# Patient Record
Sex: FEMALE | Race: WHITE | NOT HISPANIC OR LATINO | Employment: OTHER | URBAN - METROPOLITAN AREA
[De-identification: names, ages, dates, MRNs, and addresses within clinical notes are randomized per-mention and may not be internally consistent; named-entity substitution may affect disease eponyms.]

---

## 2023-03-08 ENCOUNTER — TELEPHONE (OUTPATIENT)
Dept: PRIMARY CARE | Facility: CLINIC | Age: 78
End: 2023-03-08
Payer: COMMERCIAL
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
## 2023-03-13 NOTE — TELEPHONE ENCOUNTER
Mena from [employer] called about patient wanting to get some health history on patient to decide if needs to go to nursing home. Would like to know to the best of your knowledge is patient able to walk alone,does she require 24/7 help,what is her level for taking care of herself,is daughter providing enough care. Please call [phone]

## 2023-03-14 NOTE — TELEPHONE ENCOUNTER
Called ambika at # given and notified her that patient has not been seen since nov 2022 and we could not give any  exact information due to this. Ambika stated understanding.

## 2023-04-19 DIAGNOSIS — M19.90 OSTEOARTHRITIS, UNSPECIFIED OSTEOARTHRITIS TYPE, UNSPECIFIED SITE: Primary | ICD-10-CM

## 2023-07-10 ENCOUNTER — TELEPHONE (OUTPATIENT)
Dept: PRIMARY CARE | Facility: CLINIC | Age: 78
End: 2023-07-10
Payer: COMMERCIAL

## 2023-07-10 NOTE — TELEPHONE ENCOUNTER
Adamaris with [facility] is calling to see if you will sign an H&P without seeing the patient?

## 2024-01-12 ENCOUNTER — TELEPHONE (OUTPATIENT)
Dept: NEUROLOGY | Facility: HOSPITAL | Age: 79
End: 2024-01-12
Payer: COMMERCIAL

## 2024-01-12 NOTE — TELEPHONE ENCOUNTER
"I received a call from [facility] requesting refill of Memantine.    I called POA to make FU appt for refill.  She was extremely confrontational, with multiple complaints about her sister, our clinic, the nursing home, the pharmacy.    I couldn't get her to focus and make an appt.  She decided angrily to \"find a new neurologist.\"  I thanked her for her time.    End of conversation.    [facility] updated.  "

## 2024-03-08 ENCOUNTER — APPOINTMENT (OUTPATIENT)
Dept: RADIOLOGY | Facility: HOSPITAL | Age: 79
End: 2024-03-08
Payer: COMMERCIAL

## 2024-03-08 ENCOUNTER — HOSPITAL ENCOUNTER (EMERGENCY)
Facility: HOSPITAL | Age: 79
Discharge: HOME | End: 2024-03-08
Attending: EMERGENCY MEDICINE
Payer: COMMERCIAL

## 2024-03-08 VITALS
OXYGEN SATURATION: 95 % | HEIGHT: 65 IN | RESPIRATION RATE: 18 BRPM | TEMPERATURE: 98.3 F | HEART RATE: 73 BPM | WEIGHT: 187 LBS | DIASTOLIC BLOOD PRESSURE: 123 MMHG | SYSTOLIC BLOOD PRESSURE: 142 MMHG | BODY MASS INDEX: 31.16 KG/M2
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
## 2024-03-08 DIAGNOSIS — S09.90XA CLOSED HEAD INJURY, INITIAL ENCOUNTER: Primary | ICD-10-CM

## 2024-03-08 PROCEDURE — 70450 CT HEAD/BRAIN W/O DYE: CPT

## 2024-03-08 PROCEDURE — 2500000004 HC RX 250 GENERAL PHARMACY W/ HCPCS (ALT 636 FOR OP/ED): Performed by: EMERGENCY MEDICINE

## 2024-03-08 PROCEDURE — 70450 CT HEAD/BRAIN W/O DYE: CPT | Performed by: RADIOLOGY

## 2024-03-08 PROCEDURE — 2500000004 HC RX 250 GENERAL PHARMACY W/ HCPCS (ALT 636 FOR OP/ED): Performed by: STUDENT IN AN ORGANIZED HEALTH CARE EDUCATION/TRAINING PROGRAM

## 2024-03-08 PROCEDURE — 99284 EMERGENCY DEPT VISIT MOD MDM: CPT | Mod: 25 | Performed by: EMERGENCY MEDICINE

## 2024-03-08 PROCEDURE — 96372 THER/PROPH/DIAG INJ SC/IM: CPT

## 2024-03-08 RX ORDER — LORAZEPAM 2 MG/ML
1 INJECTION INTRAMUSCULAR ONCE
Status: COMPLETED | OUTPATIENT
Start: 2024-03-08 | End: 2024-03-08

## 2024-03-08 RX ORDER — HALOPERIDOL 5 MG/ML
2 INJECTION INTRAMUSCULAR ONCE
Status: COMPLETED | OUTPATIENT
Start: 2024-03-08 | End: 2024-03-08

## 2024-03-08 RX ADMIN — HALOPERIDOL LACTATE 2 MG: 5 INJECTION, SOLUTION INTRAMUSCULAR at 06:24

## 2024-03-08 RX ADMIN — LORAZEPAM 1 MG: 2 INJECTION INTRAMUSCULAR; INTRAVENOUS at 05:16

## 2024-03-08 ASSESSMENT — PAIN DESCRIPTION - PAIN TYPE: TYPE: ACUTE PAIN

## 2024-03-08 ASSESSMENT — COLUMBIA-SUICIDE SEVERITY RATING SCALE - C-SSRS
6. HAVE YOU EVER DONE ANYTHING, STARTED TO DO ANYTHING, OR PREPARED TO DO ANYTHING TO END YOUR LIFE?: NO
2. HAVE YOU ACTUALLY HAD ANY THOUGHTS OF KILLING YOURSELF?: NO
1. IN THE PAST MONTH, HAVE YOU WISHED YOU WERE DEAD OR WISHED YOU COULD GO TO SLEEP AND NOT WAKE UP?: NO

## 2024-03-08 ASSESSMENT — LIFESTYLE VARIABLES
HAVE YOU EVER FELT YOU SHOULD CUT DOWN ON YOUR DRINKING: NO
HAVE PEOPLE ANNOYED YOU BY CRITICIZING YOUR DRINKING: NO
EVER HAD A DRINK FIRST THING IN THE MORNING TO STEADY YOUR NERVES TO GET RID OF A HANGOVER: NO
EVER FELT BAD OR GUILTY ABOUT YOUR DRINKING: NO

## 2024-03-08 ASSESSMENT — PAIN DESCRIPTION - LOCATION: LOCATION: HEAD

## 2024-03-08 ASSESSMENT — PAIN - FUNCTIONAL ASSESSMENT: PAIN_FUNCTIONAL_ASSESSMENT: 0-10

## 2024-03-08 ASSESSMENT — PAIN DESCRIPTION - PROGRESSION: CLINICAL_PROGRESSION: NOT CHANGED

## 2024-03-08 ASSESSMENT — PAIN SCALES - GENERAL
PAINLEVEL_OUTOF10: 4
PAINLEVEL_OUTOF10: 5 - MODERATE PAIN

## 2024-03-08 NOTE — PROGRESS NOTES
Patient received in sign out from Dr Faye.  Patient presenting after fall from transfer from bed to wheelchair.  Head CT was able to be obtained.  When attempts were made to obtain cervical spine CT, patient would not allow this.  Any attempts to place patient on her back resulted in patient becoming combative.  Patient was given Ativan and Haldol and while this did cause her to go to sleep, any attempts to move around her back caused her to wake up and become combative.  I palpated cervical spine and there does not seem to be any pain to palpation.  I feel that the risks of performing sedation for CT outweigh risks of missed injury in setting of no apparent tenderness to palpation.  Head CT without acute findings.  Return precautions given for any worsening symptoms.  Parts of this chart were completed with dictation software, please excuse any errors in transcription.

## 2024-03-08 NOTE — ED PROVIDER NOTES
HPI   Chief Complaint   Patient presents with    Fall         History provided by:  EMS personnel and caregiver  History limited by:  Dementia    78-year-old female with a history of dementia lives in a memory care unit had an unwitnessed fall about 30 minutes prior to arrival to the emergency department.  Patient does not remember the fall.  She does not have any specific complaints.  She apparently normally transitions from her bed to a wheelchair.  There is no history of recent illness relayed from the nursing home.  No other complaints.                  Dillon Coma Scale Score: 15                     Patient History   Past Medical History:   Diagnosis Date    Personal history of malignant neoplasm of breast     History of malignant neoplasm of breast     Past Surgical History:   Procedure Laterality Date    OTHER SURGICAL HISTORY  09/18/2020    Breast surgery    OTHER SURGICAL HISTORY  09/18/2020    Tubal ligation     No family history on file.  Social History     Tobacco Use    Smoking status: Not on file    Smokeless tobacco: Not on file   Substance Use Topics    Alcohol use: Not on file    Drug use: Not on file       Physical Exam   ED Triage Vitals   Temp Pulse Resp BP   -- -- -- --      SpO2 Temp src Heart Rate Source Patient Position   -- -- -- --      BP Location FiO2 (%)     -- --       Physical Exam  General:  Awake, alert, no acute distress.  Head: Normocephalic, hematoma right frontal region  Neck: Supple, trachea midline, no stridor, no bony tenderness  Skin: Warm and dry, no rashes, small skin tear dorsum right hand  Lungs:  No acute respiratory distress, speaking in full sentences without difficulty  Neuro:  No gross focal neurologic deficits, NIH is 0  Musculoskeletal:  Full range of motion in all 4 extremities  Psychiatric: Confused consistent with her dementia  ED Course & MDM   Diagnoses as of 03/08/24 2223   Closed head injury, initial encounter   Fall, initial encounter       Medical  Decision Making  Head and C-spine CTs are negative.  This appears to be mechanical fall.  She does not appear to have any other injuries.  She is stable for discharge back to the nursing facility    Procedure  Procedures     Isael Faye DO  03/08/24 [id number]

## 2024-03-08 NOTE — ED TRIAGE NOTES
Pt presents via EMS after and unwitnessed fall at [facility]. Pt is A/O to 2 and a poor historian. Pt is not on blood thinners. Pt has c/o a headache, a small abrasion on her right cheek and a small lac on her right hand. Pt has no c/o neck or back pain. EMS attempted to place a c-collar on the pt and she could not tolerate it.

## 2024-03-15 ENCOUNTER — NURSING HOME VISIT (OUTPATIENT)
Dept: POST ACUTE CARE | Facility: EXTERNAL LOCATION | Age: 79
End: 2024-03-15
Payer: COMMERCIAL

## 2024-03-15 DIAGNOSIS — F03.A0 MILD DEMENTIA WITHOUT BEHAVIORAL DISTURBANCE, PSYCHOTIC DISTURBANCE, MOOD DISTURBANCE, OR ANXIETY, UNSPECIFIED DEMENTIA TYPE (MULTI): Primary | ICD-10-CM

## 2024-03-15 DIAGNOSIS — M79.89 LEG SWELLING: ICD-10-CM

## 2024-03-15 DIAGNOSIS — F33.9 MAJOR DEPRESSION, RECURRENT, CHRONIC (CMS-HCC): ICD-10-CM

## 2024-03-15 PROCEDURE — 99305 1ST NF CARE MODERATE MDM 35: CPT | Performed by: INTERNAL MEDICINE

## 2024-03-15 NOTE — LETTER
Patient: Stephanie Johnson  : 1945    Encounter Date: 03/15/2024    [facility]: [facility]  HISTORY AND PHYSICAL NOTE      Stephanie Johnson is a 78 y.o. female that is being seen  today for admission for rehab at nursing facility  Subjective  Patient is a 78-year-old female with history of dementia and leg swelling who is being seen for admission at [facility].  Patient was recently at a memory care unit where she had a fall and was seen in the hospital.  Patient had imaging done which was negative for any fracture.  Patient was diagnosed with UTI and treated with antibiotics.  Patient is now being admitted at [facility].  Patient is not able to provide adequate review of system or history.  All history is taken from the charts.      ROS  Patient is demented and not able to provide adequate review of system    Vital signs are stable  Medication list reviewed in the facility.    Physical Exam  Constitutional: Patient does not appear to be in any acute distress  Head and Face: NCAT  Eyes: Normal external exam, EOMI  ENT: Normal external inspection of ears and nose. Oropharynx normal.  Cardiovascular: RRR, S1/S2, no murmurs, rubs, or gallops, radial pulses +2, mild swelling of the legs present  Pulmonary: CTAB, no respiratory distress.  Abdomen: +BS, soft, non-tender, nondistended, no guarding or rebound, no masses noted  MSK: Patient has difficulty in walking and is in wheelchair  Skin-few bruises present  Peripheral puslses palpable bilaterally 2+  Neuro: AAO X1, Cranial nerves 2-12 grossly intact,DTR 2+ in all 4 limbs       LABS   [unfilled]  Lab Results   Component Value Date    GLUCOSE 85 2023    CALCIUM 9.3 2023     2023    K 3.9 2023    CO2 26 2023     2023    BUN 10 2023    CREATININE 0.9 2023     Lab Results   Component Value Date    ALT 7 2023    AST 19 2023    ALKPHOS 72 2023     BILITOT 0.4 07/12/2023     Lab Results   Component Value Date    WBC 8.9 07/12/2023    HGB 13.2 07/12/2023    HCT 39.1 07/12/2023    MCV 86.5 07/12/2023     07/12/2023     Lab Results   Component Value Date    CHOL 165 07/12/2023    CHOL 170 08/15/2022     Lab Results   Component Value Date    HDL 49 (L) 07/12/2023    HDL 47.1 08/15/2022     Lab Results   Component Value Date    LDLCALC 102 07/12/2023     Lab Results   Component Value Date    TRIG 69 07/12/2023    TRIG 104 08/15/2022     Lab Results   Component Value Date    HGBA1C 5.8 07/12/2023     Other labs not included in the list above were reviewed either before or during this encounter.    History   Past Medical History:   Diagnosis Date   • Personal history of malignant neoplasm of breast     History of malignant neoplasm of breast     Past Surgical History:   Procedure Laterality Date   • OTHER SURGICAL HISTORY  09/18/2020    Breast surgery   • OTHER SURGICAL HISTORY  09/18/2020    Tubal ligation     No family history on file.  No Known Allergies  No current outpatient medications on file prior to visit.     No current facility-administered medications on file prior to visit.       There is no immunization history on file for this patient.  Patient's medical history was reviewed and updated either before or during this encounter.  ASSESSMENT / PLAN:  Diagnoses and all orders for this visit:  Mild dementia without behavioral disturbance, psychotic disturbance, mood disturbance, or anxiety, unspecified dementia type (CMS/HCC)  Major depression, recurrent, chronic (CMS/HCC)  Leg swelling    Patient is being seen for admission at nursing home after hospitalization.  Patient hospital records reviewed.  Patient has been on diuretics for leg swelling.  Kidney functions were stable.  Imaging was done in the hospital reviewed and were stable.  Patient needs assistance with her activities.  Patient also will be getting physical therapy.      Uriel WELLER  MD Ruy       Electronically Signed By: Uriel Redmond MD   3/30/24  5:37 PM

## 2024-03-22 ENCOUNTER — NURSING HOME VISIT (OUTPATIENT)
Dept: POST ACUTE CARE | Facility: EXTERNAL LOCATION | Age: 79
End: 2024-03-22
Payer: COMMERCIAL

## 2024-03-22 DIAGNOSIS — F03.A11 MILD DEMENTIA WITH AGITATION, UNSPECIFIED DEMENTIA TYPE (MULTI): ICD-10-CM

## 2024-03-22 DIAGNOSIS — E55.9 VITAMIN D DEFICIENCY: ICD-10-CM

## 2024-03-22 DIAGNOSIS — F33.9 MAJOR DEPRESSION, RECURRENT, CHRONIC (CMS-HCC): Primary | ICD-10-CM

## 2024-03-22 DIAGNOSIS — M79.89 LEG SWELLING: ICD-10-CM

## 2024-03-22 PROCEDURE — 99308 SBSQ NF CARE LOW MDM 20: CPT | Performed by: INTERNAL MEDICINE

## 2024-03-22 NOTE — LETTER
Patient: Stephanie Johnson  : 1945    Encounter Date: 2024    [facility]: [facility]  PROGRESS NOTE      Stephanie Johnson is a 78 y.o. female that is being seen  today for follow up at rehab.  Subjective  Patient is a 78-year-old female with history of dementia and leg swelling who is being seen for follow up at nursing facility.  Patient's lab work reviewed has been stable.  Patient is on diuretics and kidney functions are stable.  Patient does have low vitamin D levels.  Patient has been on vitamin D supplementation.  Patient has been getting physical therapy.  No recent falls.      ROS  Patient is demented and not able to provide adequate review of system    Vital signs are stable  Medication list reviewed in the facility.    Physical Exam  Constitutional: Patient does not appear to be in any acute distress  Head and Face: NCAT  Eyes: Normal external exam, EOMI  ENT: Normal external inspection of ears and nose. Oropharynx normal.  Cardiovascular: RRR, S1/S2, no murmurs, rubs, or gallops, radial pulses +2, mild swelling of the legs present  Pulmonary: CTAB, no respiratory distress.  Abdomen: +BS, soft, non-tender, nondistended, no guarding or rebound, no masses noted  MSK: Patient has difficulty in walking and is in wheelchair  Skin-few bruises present  Peripheral puslses palpable bilaterally 2+  Neuro: AAO X1, Cranial nerves 2-12 grossly intact,DTR 2+ in all 4 limbs       LABS   [unfilled]  Lab Results   Component Value Date    GLUCOSE 85 2023    CALCIUM 9.3 2023     2023    K 3.9 2023    CO2 26 2023     2023    BUN 10 2023    CREATININE 0.9 2023     Lab Results   Component Value Date    ALT 7 2023    AST 19 2023    ALKPHOS 72 2023    BILITOT 0.4 2023     Lab Results   Component Value Date    WBC 8.9 2023    HGB 13.2 2023    HCT 39.1 2023    MCV 86.5 2023      07/12/2023     Lab Results   Component Value Date    CHOL 165 07/12/2023    CHOL 170 08/15/2022     Lab Results   Component Value Date    HDL 49 (L) 07/12/2023    HDL 47.1 08/15/2022     Lab Results   Component Value Date    LDLCALC 102 07/12/2023     Lab Results   Component Value Date    TRIG 69 07/12/2023    TRIG 104 08/15/2022     Lab Results   Component Value Date    HGBA1C 5.8 07/12/2023     Other labs not included in the list above were reviewed either before or during this encounter.    History   Past Medical History:   Diagnosis Date   • Personal history of malignant neoplasm of breast     History of malignant neoplasm of breast     Past Surgical History:   Procedure Laterality Date   • OTHER SURGICAL HISTORY  09/18/2020    Breast surgery   • OTHER SURGICAL HISTORY  09/18/2020    Tubal ligation     No family history on file.  No Known Allergies  No current outpatient medications on file prior to visit.     No current facility-administered medications on file prior to visit.       There is no immunization history on file for this patient.  Patient's medical history was reviewed and updated either before or during this encounter.  ASSESSMENT / PLAN:  Diagnoses and all orders for this visit:  Major depression, recurrent, chronic (CMS/HCC)  Mild dementia with agitation, unspecified dementia type (CMS/HCC)  Leg swelling  Vitamin D deficiency    Patient is being seen for follow-up at the nursing home.  Patient had lab work done which shows low vitamin D.  Patient has been on vitamin D supplementation.  Patient is getting physical therapy.    Uriel Redmond MD       Electronically Signed By: Uriel Redmond MD   3/30/24  5:45 PM

## 2024-03-29 ENCOUNTER — NURSING HOME VISIT (OUTPATIENT)
Dept: POST ACUTE CARE | Facility: EXTERNAL LOCATION | Age: 79
End: 2024-03-29
Payer: COMMERCIAL

## 2024-03-29 DIAGNOSIS — F03.A11 MILD DEMENTIA WITH AGITATION, UNSPECIFIED DEMENTIA TYPE (MULTI): ICD-10-CM

## 2024-03-29 DIAGNOSIS — M79.89 LEG SWELLING: ICD-10-CM

## 2024-03-29 DIAGNOSIS — F33.9 MAJOR DEPRESSION, RECURRENT, CHRONIC (CMS-HCC): ICD-10-CM

## 2024-03-29 DIAGNOSIS — E55.9 VITAMIN D DEFICIENCY: Primary | ICD-10-CM

## 2024-03-29 PROCEDURE — 99309 SBSQ NF CARE MODERATE MDM 30: CPT | Performed by: INTERNAL MEDICINE

## 2024-03-29 NOTE — LETTER
Patient: Stephanie Johnson  : 1945    Encounter Date: 2024    [facility]: [facility]  PROGRESS NOTE      Stephanie Johnson is a 78 y.o. female that is being seen  today for follow up at rehab.  Subjective  Patient is a 78-year-old female with history of dementia and leg swelling who is being seen for follow up at nursing facility.  Patient's lab work reviewed has been stable.  Patient is on diuretics and kidney functions are stable.  Patient does have low vitamin D levels.  Patient has been on vitamin D supplementation.  Patient has been getting physical therapy.  No recent falls.  Patient has been scheduled to be transferred to another facility in the memory unit.      ROS  Patient is demented and not able to provide adequate review of system    Vital signs are stable  Medication list reviewed in the facility.    Physical Exam  Constitutional: Patient does not appear to be in any acute distress  Head and Face: NCAT  Eyes: Normal external exam, EOMI  ENT: Normal external inspection of ears and nose. Oropharynx normal.  Cardiovascular: RRR, S1/S2, no murmurs, rubs, or gallops, radial pulses +2, mild swelling of the legs present  Pulmonary: CTAB, no respiratory distress.  Abdomen: +BS, soft, non-tender, nondistended, no guarding or rebound, no masses noted  MSK: Patient has difficulty in walking and is in wheelchair  Skin-few bruises present  Peripheral puslses palpable bilaterally 2+  Neuro: AAO X1, Cranial nerves 2-12 grossly intact,DTR 2+ in all 4 limbs       LABS   [unfilled]  Lab Results   Component Value Date    GLUCOSE 85 2023    CALCIUM 9.3 2023     2023    K 3.9 2023    CO2 26 2023     2023    BUN 10 2023    CREATININE 0.9 2023     Lab Results   Component Value Date    ALT 7 2023    AST 19 2023    ALKPHOS 72 2023    BILITOT 0.4 2023     Lab Results   Component Value Date    WBC 8.9 2023     HGB 13.2 07/12/2023    HCT 39.1 07/12/2023    MCV 86.5 07/12/2023     07/12/2023     Lab Results   Component Value Date    CHOL 165 07/12/2023    CHOL 170 08/15/2022     Lab Results   Component Value Date    HDL 49 (L) 07/12/2023    HDL 47.1 08/15/2022     Lab Results   Component Value Date    LDLCALC 102 07/12/2023     Lab Results   Component Value Date    TRIG 69 07/12/2023    TRIG 104 08/15/2022     Lab Results   Component Value Date    HGBA1C 5.8 07/12/2023     Other labs not included in the list above were reviewed either before or during this encounter.    History   Past Medical History:   Diagnosis Date   • Personal history of malignant neoplasm of breast     History of malignant neoplasm of breast     Past Surgical History:   Procedure Laterality Date   • OTHER SURGICAL HISTORY  09/18/2020    Breast surgery   • OTHER SURGICAL HISTORY  09/18/2020    Tubal ligation     No family history on file.  No Known Allergies  No current outpatient medications on file prior to visit.     No current facility-administered medications on file prior to visit.       There is no immunization history on file for this patient.  Patient's medical history was reviewed and updated either before or during this encounter.  ASSESSMENT / PLAN:  Diagnoses and all orders for this visit:  Vitamin D deficiency  Mild dementia with agitation, unspecified dementia type (CMS/HCC)  Major depression, recurrent, chronic (CMS/HCC)  Leg swelling    Patient is being seen for follow-up at the nursing home.  Patient had lab work done which shows low vitamin D.  Patient has been on vitamin D supplementation.  Patient is getting physical therapy.    Uriel Redmond MD       Electronically Signed By: Uriel Redmond MD   3/30/24  5:47 PM

## 2024-03-30 PROBLEM — E55.9 VITAMIN D DEFICIENCY: Status: ACTIVE | Noted: 2024-03-30

## 2024-03-30 PROBLEM — F03.A11: Status: ACTIVE | Noted: 2024-03-30

## 2024-03-30 PROBLEM — F33.9 MAJOR DEPRESSION, RECURRENT, CHRONIC (CMS-HCC): Status: ACTIVE | Noted: 2024-03-30

## 2024-03-30 PROBLEM — E55.9 VITAMIN D DEFICIENCY: Status: RESOLVED | Noted: 2024-03-30 | Resolved: 2024-03-30

## 2024-03-30 PROBLEM — M79.89 LEG SWELLING: Status: ACTIVE | Noted: 2024-03-30

## 2024-03-30 PROBLEM — F03.A0 MILD DEMENTIA WITHOUT BEHAVIORAL DISTURBANCE, PSYCHOTIC DISTURBANCE, MOOD DISTURBANCE, OR ANXIETY, UNSPECIFIED DEMENTIA TYPE (MULTI): Status: ACTIVE | Noted: 2024-03-30

## 2024-03-30 NOTE — PROGRESS NOTES
[facility]: [facility]  HISTORY AND PHYSICAL NOTE      Stephanie Johnson is a 78 y.o. female that is being seen  today for admission for rehab at nursing facility  Subjective   Patient is a 78-year-old female with history of dementia and leg swelling who is being seen for admission at [facility].  Patient was recently at a memory care unit where she had a fall and was seen in the hospital.  Patient had imaging done which was negative for any fracture.  Patient was diagnosed with UTI and treated with antibiotics.  Patient is now being admitted at [facility].  Patient is not able to provide adequate review of system or history.  All history is taken from the charts.      ROS  Patient is demented and not able to provide adequate review of system    Vital signs are stable  Medication list reviewed in the facility.    Physical Exam  Constitutional: Patient does not appear to be in any acute distress  Head and Face: NCAT  Eyes: Normal external exam, EOMI  ENT: Normal external inspection of ears and nose. Oropharynx normal.  Cardiovascular: RRR, S1/S2, no murmurs, rubs, or gallops, radial pulses +2, mild swelling of the legs present  Pulmonary: CTAB, no respiratory distress.  Abdomen: +BS, soft, non-tender, nondistended, no guarding or rebound, no masses noted  MSK: Patient has difficulty in walking and is in wheelchair  Skin-few bruises present  Peripheral puslses palpable bilaterally 2+  Neuro: AAO X1, Cranial nerves 2-12 grossly intact,DTR 2+ in all 4 limbs       LABS   [unfilled]  Lab Results   Component Value Date    GLUCOSE 85 07/12/2023    CALCIUM 9.3 07/12/2023     07/12/2023    K 3.9 07/12/2023    CO2 26 07/12/2023     07/12/2023    BUN 10 07/12/2023    CREATININE 0.9 07/12/2023     Lab Results   Component Value Date    ALT 7 07/12/2023    AST 19 07/12/2023    ALKPHOS 72 07/12/2023    BILITOT 0.4 07/12/2023     Lab Results   Component Value Date    WBC 8.9  07/12/2023    HGB 13.2 07/12/2023    HCT 39.1 07/12/2023    MCV 86.5 07/12/2023     07/12/2023     Lab Results   Component Value Date    CHOL 165 07/12/2023    CHOL 170 08/15/2022     Lab Results   Component Value Date    HDL 49 (L) 07/12/2023    HDL 47.1 08/15/2022     Lab Results   Component Value Date    LDLCALC 102 07/12/2023     Lab Results   Component Value Date    TRIG 69 07/12/2023    TRIG 104 08/15/2022     Lab Results   Component Value Date    HGBA1C 5.8 07/12/2023     Other labs not included in the list above were reviewed either before or during this encounter.    History    Past Medical History:   Diagnosis Date    Personal history of malignant neoplasm of breast     History of malignant neoplasm of breast     Past Surgical History:   Procedure Laterality Date    OTHER SURGICAL HISTORY  09/18/2020    Breast surgery    OTHER SURGICAL HISTORY  09/18/2020    Tubal ligation     No family history on file.  No Known Allergies  No current outpatient medications on file prior to visit.     No current facility-administered medications on file prior to visit.       There is no immunization history on file for this patient.  Patient's medical history was reviewed and updated either before or during this encounter.  ASSESSMENT / PLAN:  Diagnoses and all orders for this visit:  Mild dementia without behavioral disturbance, psychotic disturbance, mood disturbance, or anxiety, unspecified dementia type (CMS/HCC)  Major depression, recurrent, chronic (CMS/HCC)  Leg swelling    Patient is being seen for admission at nursing home after hospitalization.  Patient hospital records reviewed.  Patient has been on diuretics for leg swelling.  Kidney functions were stable.  Imaging was done in the hospital reviewed and were stable.  Patient needs assistance with her activities.  Patient also will be getting physical therapy.      Uriel Redmond MD

## 2024-03-30 NOTE — PROGRESS NOTES
[facility]: [facility]  PROGRESS NOTE      Stephanie Johnson is a 78 y.o. female that is being seen  today for follow up at rehab.  Subjective   Patient is a 78-year-old female with history of dementia and leg swelling who is being seen for follow up at nursing facility.  Patient's lab work reviewed has been stable.  Patient is on diuretics and kidney functions are stable.  Patient does have low vitamin D levels.  Patient has been on vitamin D supplementation.  Patient has been getting physical therapy.  No recent falls.  Patient has been scheduled to be transferred to another facility in the memory unit.      ROS  Patient is demented and not able to provide adequate review of system    Vital signs are stable  Medication list reviewed in the facility.    Physical Exam  Constitutional: Patient does not appear to be in any acute distress  Head and Face: NCAT  Eyes: Normal external exam, EOMI  ENT: Normal external inspection of ears and nose. Oropharynx normal.  Cardiovascular: RRR, S1/S2, no murmurs, rubs, or gallops, radial pulses +2, mild swelling of the legs present  Pulmonary: CTAB, no respiratory distress.  Abdomen: +BS, soft, non-tender, nondistended, no guarding or rebound, no masses noted  MSK: Patient has difficulty in walking and is in wheelchair  Skin-few bruises present  Peripheral puslses palpable bilaterally 2+  Neuro: AAO X1, Cranial nerves 2-12 grossly intact,DTR 2+ in all 4 limbs       LABS   [unfilled]  Lab Results   Component Value Date    GLUCOSE 85 07/12/2023    CALCIUM 9.3 07/12/2023     07/12/2023    K 3.9 07/12/2023    CO2 26 07/12/2023     07/12/2023    BUN 10 07/12/2023    CREATININE 0.9 07/12/2023     Lab Results   Component Value Date    ALT 7 07/12/2023    AST 19 07/12/2023    ALKPHOS 72 07/12/2023    BILITOT 0.4 07/12/2023     Lab Results   Component Value Date    WBC 8.9 07/12/2023    HGB 13.2 07/12/2023    HCT 39.1 07/12/2023    MCV 86.5 07/12/2023      07/12/2023     Lab Results   Component Value Date    CHOL 165 07/12/2023    CHOL 170 08/15/2022     Lab Results   Component Value Date    HDL 49 (L) 07/12/2023    HDL 47.1 08/15/2022     Lab Results   Component Value Date    LDLCALC 102 07/12/2023     Lab Results   Component Value Date    TRIG 69 07/12/2023    TRIG 104 08/15/2022     Lab Results   Component Value Date    HGBA1C 5.8 07/12/2023     Other labs not included in the list above were reviewed either before or during this encounter.    History    Past Medical History:   Diagnosis Date    Personal history of malignant neoplasm of breast     History of malignant neoplasm of breast     Past Surgical History:   Procedure Laterality Date    OTHER SURGICAL HISTORY  09/18/2020    Breast surgery    OTHER SURGICAL HISTORY  09/18/2020    Tubal ligation     No family history on file.  No Known Allergies  No current outpatient medications on file prior to visit.     No current facility-administered medications on file prior to visit.       There is no immunization history on file for this patient.  Patient's medical history was reviewed and updated either before or during this encounter.  ASSESSMENT / PLAN:  Diagnoses and all orders for this visit:  Vitamin D deficiency  Mild dementia with agitation, unspecified dementia type (CMS/HCC)  Major depression, recurrent, chronic (CMS/HCC)  Leg swelling    Patient is being seen for follow-up at the nursing home.  Patient had lab work done which shows low vitamin D.  Patient has been on vitamin D supplementation.  Patient is getting physical therapy.    Uriel Redmond MD

## 2024-03-30 NOTE — PROGRESS NOTES
[facility]: [facility]  PROGRESS NOTE      Stephanie Johnson is a 78 y.o. female that is being seen  today for follow up at rehab.  Subjective   Patient is a 78-year-old female with history of dementia and leg swelling who is being seen for follow up at nursing facility.  Patient's lab work reviewed has been stable.  Patient is on diuretics and kidney functions are stable.  Patient does have low vitamin D levels.  Patient has been on vitamin D supplementation.  Patient has been getting physical therapy.  No recent falls.      ROS  Patient is demented and not able to provide adequate review of system    Vital signs are stable  Medication list reviewed in the facility.    Physical Exam  Constitutional: Patient does not appear to be in any acute distress  Head and Face: NCAT  Eyes: Normal external exam, EOMI  ENT: Normal external inspection of ears and nose. Oropharynx normal.  Cardiovascular: RRR, S1/S2, no murmurs, rubs, or gallops, radial pulses +2, mild swelling of the legs present  Pulmonary: CTAB, no respiratory distress.  Abdomen: +BS, soft, non-tender, nondistended, no guarding or rebound, no masses noted  MSK: Patient has difficulty in walking and is in wheelchair  Skin-few bruises present  Peripheral puslses palpable bilaterally 2+  Neuro: AAO X1, Cranial nerves 2-12 grossly intact,DTR 2+ in all 4 limbs       LABS   [unfilled]  Lab Results   Component Value Date    GLUCOSE 85 07/12/2023    CALCIUM 9.3 07/12/2023     07/12/2023    K 3.9 07/12/2023    CO2 26 07/12/2023     07/12/2023    BUN 10 07/12/2023    CREATININE 0.9 07/12/2023     Lab Results   Component Value Date    ALT 7 07/12/2023    AST 19 07/12/2023    ALKPHOS 72 07/12/2023    BILITOT 0.4 07/12/2023     Lab Results   Component Value Date    WBC 8.9 07/12/2023    HGB 13.2 07/12/2023    HCT 39.1 07/12/2023    MCV 86.5 07/12/2023     07/12/2023     Lab Results   Component Value Date    CHOL 165 07/12/2023     CHOL 170 08/15/2022     Lab Results   Component Value Date    HDL 49 (L) 07/12/2023    HDL 47.1 08/15/2022     Lab Results   Component Value Date    LDLCALC 102 07/12/2023     Lab Results   Component Value Date    TRIG 69 07/12/2023    TRIG 104 08/15/2022     Lab Results   Component Value Date    HGBA1C 5.8 07/12/2023     Other labs not included in the list above were reviewed either before or during this encounter.    History    Past Medical History:   Diagnosis Date    Personal history of malignant neoplasm of breast     History of malignant neoplasm of breast     Past Surgical History:   Procedure Laterality Date    OTHER SURGICAL HISTORY  09/18/2020    Breast surgery    OTHER SURGICAL HISTORY  09/18/2020    Tubal ligation     No family history on file.  No Known Allergies  No current outpatient medications on file prior to visit.     No current facility-administered medications on file prior to visit.       There is no immunization history on file for this patient.  Patient's medical history was reviewed and updated either before or during this encounter.  ASSESSMENT / PLAN:  Diagnoses and all orders for this visit:  Major depression, recurrent, chronic (CMS/HCC)  Mild dementia with agitation, unspecified dementia type (CMS/HCC)  Leg swelling  Vitamin D deficiency    Patient is being seen for follow-up at the nursing home.  Patient had lab work done which shows low vitamin D.  Patient has been on vitamin D supplementation.  Patient is getting physical therapy.    Uriel Redmond MD

## 2024-04-05 ENCOUNTER — NURSING HOME VISIT (OUTPATIENT)
Dept: POST ACUTE CARE | Facility: EXTERNAL LOCATION | Age: 79
End: 2024-04-05
Payer: COMMERCIAL

## 2024-04-05 DIAGNOSIS — F33.9 MAJOR DEPRESSION, RECURRENT, CHRONIC (CMS-HCC): Primary | ICD-10-CM

## 2024-04-05 DIAGNOSIS — F03.A11 MILD DEMENTIA WITH AGITATION, UNSPECIFIED DEMENTIA TYPE (MULTI): ICD-10-CM

## 2024-04-05 DIAGNOSIS — M79.89 LEG SWELLING: ICD-10-CM

## 2024-04-05 PROCEDURE — 99306 1ST NF CARE HIGH MDM 50: CPT | Performed by: INTERNAL MEDICINE

## 2024-04-05 NOTE — LETTER
Patient: Stephanie Johnson  : 1945    Encounter Date: 2024    [facility]: [facility]  HISTORY AND PHYSICAL NOTE      Stephanie Johnson is a 78 y.o. female that is being seen  today for admission for rehab at nursing facility  Subjective  Patient is a 78-year-old female with history of dementia and leg swelling who is being seen for admission at [facility].  Patient was recently at a memory care unit where she had a fall and was seen in the hospital.  Patient had imaging done which was negative for any fracture.  Patient was diagnosed with UTI and treated with antibiotics.  Patient is now being admitted at [facility] ([place], [place]).  Patient is not able to provide adequate review of system or history.  All history is taken from the charts.      ROS  Patient is demented and not able to provide adequate review of system    Vital signs are stable  Medication list reviewed in the facility.    Physical Exam  Constitutional: Patient does not appear to be in any acute distress  Head and Face: NCAT  Eyes: Normal external exam, EOMI  ENT: Normal external inspection of ears and nose. Oropharynx normal.  Cardiovascular: RRR, S1/S2, no murmurs, rubs, or gallops, radial pulses +2, mild swelling of the legs present  Pulmonary: CTAB, no respiratory distress.  Abdomen: +BS, soft, non-tender, nondistended, no guarding or rebound, no masses noted  MSK: Patient has difficulty in walking and is in wheelchair  Skin-few bruises present  Peripheral puslses palpable bilaterally 2+  Neuro: AAO X1, Cranial nerves 2-12 grossly intact,DTR 2+ in all 4 limbs       LABS   [unfilled]  Lab Results   Component Value Date    GLUCOSE 85 2023    CALCIUM 9.3 2023     2023    K 3.9 2023    CO2 26 2023     2023    BUN 10 2023    CREATININE 0.9 2023     Lab Results   Component Value Date    ALT 7 2023    AST 19 2023    ALKPHOS 72 2023     BILITOT 0.4 07/12/2023     Lab Results   Component Value Date    WBC 8.9 07/12/2023    HGB 13.2 07/12/2023    HCT 39.1 07/12/2023    MCV 86.5 07/12/2023     07/12/2023     Lab Results   Component Value Date    CHOL 165 07/12/2023    CHOL 170 08/15/2022     Lab Results   Component Value Date    HDL 49 (L) 07/12/2023    HDL 47.1 08/15/2022     Lab Results   Component Value Date    LDLCALC 102 07/12/2023     Lab Results   Component Value Date    TRIG 69 07/12/2023    TRIG 104 08/15/2022     Lab Results   Component Value Date    HGBA1C 5.8 07/12/2023     Other labs not included in the list above were reviewed either before or during this encounter.    History   Past Medical History:   Diagnosis Date   • Personal history of malignant neoplasm of breast     History of malignant neoplasm of breast     Past Surgical History:   Procedure Laterality Date   • OTHER SURGICAL HISTORY  09/18/2020    Breast surgery   • OTHER SURGICAL HISTORY  09/18/2020    Tubal ligation     No family history on file.  No Known Allergies  No current outpatient medications on file prior to visit.     No current facility-administered medications on file prior to visit.       There is no immunization history on file for this patient.  Patient's medical history was reviewed and updated either before or during this encounter.  ASSESSMENT / PLAN:  Diagnoses and all orders for this visit:  Major depression, recurrent, chronic (CMS/HCC)  Mild dementia with agitation, unspecified dementia type (CMS/HCC)  Leg swelling    Patient is being seen for admission at nursing home after hospitalization.  Patient hospital records reviewed.  Patient has been on diuretics for leg swelling.  Kidney functions were stable.  Imaging was done in the hospital reviewed and were stable.  Patient needs assistance with her activities.  Patient also  getting physical therapy.      Uriel Redmond MD       Electronically Signed By: Uriel Redmond MD   4/10/24  1:37  PM

## 2024-04-08 NOTE — PROGRESS NOTES
[facility]: [facility]  HISTORY AND PHYSICAL NOTE      Stephanie Johnson is a 78 y.o. female that is being seen  today for admission for rehab at nursing facility  Subjective   Patient is a 78-year-old female with history of dementia and leg swelling who is being seen for admission at [facility].  Patient was recently at a memory care unit where she had a fall and was seen in the hospital.  Patient had imaging done which was negative for any fracture.  Patient was diagnosed with UTI and treated with antibiotics.  Patient is now being admitted at [facility].  Patient is not able to provide adequate review of system or history.  All history is taken from the charts.      ROS  Patient is demented and not able to provide adequate review of system    Vital signs are stable  Medication list reviewed in the facility.    Physical Exam  Constitutional: Patient does not appear to be in any acute distress  Head and Face: NCAT  Eyes: Normal external exam, EOMI  ENT: Normal external inspection of ears and nose. Oropharynx normal.  Cardiovascular: RRR, S1/S2, no murmurs, rubs, or gallops, radial pulses +2, mild swelling of the legs present  Pulmonary: CTAB, no respiratory distress.  Abdomen: +BS, soft, non-tender, nondistended, no guarding or rebound, no masses noted  MSK: Patient has difficulty in walking and is in wheelchair  Skin-few bruises present  Peripheral puslses palpable bilaterally 2+  Neuro: AAO X1, Cranial nerves 2-12 grossly intact,DTR 2+ in all 4 limbs       LABS   [unfilled]  Lab Results   Component Value Date    GLUCOSE 85 07/12/2023    CALCIUM 9.3 07/12/2023     07/12/2023    K 3.9 07/12/2023    CO2 26 07/12/2023     07/12/2023    BUN 10 07/12/2023    CREATININE 0.9 07/12/2023     Lab Results   Component Value Date    ALT 7 07/12/2023    AST 19 07/12/2023    ALKPHOS 72 07/12/2023    BILITOT 0.4 07/12/2023     Lab Results   Component Value Date    WBC 8.9  07/12/2023    HGB 13.2 07/12/2023    HCT 39.1 07/12/2023    MCV 86.5 07/12/2023     07/12/2023     Lab Results   Component Value Date    CHOL 165 07/12/2023    CHOL 170 08/15/2022     Lab Results   Component Value Date    HDL 49 (L) 07/12/2023    HDL 47.1 08/15/2022     Lab Results   Component Value Date    LDLCALC 102 07/12/2023     Lab Results   Component Value Date    TRIG 69 07/12/2023    TRIG 104 08/15/2022     Lab Results   Component Value Date    HGBA1C 5.8 07/12/2023     Other labs not included in the list above were reviewed either before or during this encounter.    History    Past Medical History:   Diagnosis Date    Personal history of malignant neoplasm of breast     History of malignant neoplasm of breast     Past Surgical History:   Procedure Laterality Date    OTHER SURGICAL HISTORY  09/18/2020    Breast surgery    OTHER SURGICAL HISTORY  09/18/2020    Tubal ligation     No family history on file.  No Known Allergies  No current outpatient medications on file prior to visit.     No current facility-administered medications on file prior to visit.       There is no immunization history on file for this patient.  Patient's medical history was reviewed and updated either before or during this encounter.  ASSESSMENT / PLAN:  Diagnoses and all orders for this visit:  Major depression, recurrent, chronic (CMS/HCC)  Mild dementia with agitation, unspecified dementia type (CMS/HCC)  Leg swelling    Patient is being seen for admission at nursing home after hospitalization.  Patient hospital records reviewed.  Patient has been on diuretics for leg swelling.  Kidney functions were stable.  Imaging was done in the hospital reviewed and were stable.  Patient needs assistance with her activities.  Patient also  getting physical therapy.      Uriel Redmond MD

## 2024-04-26 ENCOUNTER — APPOINTMENT (OUTPATIENT)
Dept: PRIMARY CARE | Facility: CLINIC | Age: 79
End: 2024-04-26
Payer: COMMERCIAL

## 2024-04-26 ENCOUNTER — LAB REQUISITION (OUTPATIENT)
Dept: LAB | Facility: HOSPITAL | Age: 79
End: 2024-04-26

## 2024-04-26 DIAGNOSIS — Z79.899 OTHER LONG TERM (CURRENT) DRUG THERAPY: ICD-10-CM

## 2024-04-26 LAB
APPEARANCE UR: ABNORMAL
BACTERIA #/AREA URNS AUTO: ABNORMAL /HPF
BILIRUB UR STRIP.AUTO-MCNC: NEGATIVE MG/DL
COLOR UR: YELLOW
GLUCOSE UR STRIP.AUTO-MCNC: NORMAL MG/DL
KETONES UR STRIP.AUTO-MCNC: NEGATIVE MG/DL
LEUKOCYTE ESTERASE UR QL STRIP.AUTO: ABNORMAL
MUCOUS THREADS #/AREA URNS AUTO: ABNORMAL /LPF
NITRITE UR QL STRIP.AUTO: ABNORMAL
PH UR STRIP.AUTO: 6 [PH]
PROT UR STRIP.AUTO-MCNC: ABNORMAL MG/DL
RBC # UR STRIP.AUTO: ABNORMAL /UL
RBC #/AREA URNS AUTO: ABNORMAL /HPF
SP GR UR STRIP.AUTO: 1.01
SQUAMOUS #/AREA URNS AUTO: ABNORMAL /HPF
UROBILINOGEN UR STRIP.AUTO-MCNC: NORMAL MG/DL
WBC #/AREA URNS AUTO: >50 /HPF
WBC CLUMPS #/AREA URNS AUTO: ABNORMAL /HPF

## 2024-04-26 PROCEDURE — 81001 URINALYSIS AUTO W/SCOPE: CPT

## 2024-05-13 ENCOUNTER — OFFICE VISIT (OUTPATIENT)
Dept: NEUROLOGY | Facility: CLINIC | Age: 79
End: 2024-05-13
Payer: COMMERCIAL

## 2024-05-13 VITALS
DIASTOLIC BLOOD PRESSURE: 78 MMHG | WEIGHT: 174 LBS | BODY MASS INDEX: 27.31 KG/M2 | HEART RATE: 59 BPM | HEIGHT: 67 IN | SYSTOLIC BLOOD PRESSURE: 108 MMHG

## 2024-05-13 DIAGNOSIS — F02.B0 MODERATE LATE ONSET ALZHEIMER'S DEMENTIA, UNSPECIFIED WHETHER BEHAVIORAL, PSYCHOTIC, OR MOOD DISTURBANCE OR ANXIETY (MULTI): Primary | ICD-10-CM

## 2024-05-13 DIAGNOSIS — G30.1 MODERATE LATE ONSET ALZHEIMER'S DEMENTIA, UNSPECIFIED WHETHER BEHAVIORAL, PSYCHOTIC, OR MOOD DISTURBANCE OR ANXIETY (MULTI): Primary | ICD-10-CM

## 2024-05-13 PROCEDURE — 99215 OFFICE O/P EST HI 40 MIN: CPT | Performed by: PSYCHIATRY & NEUROLOGY

## 2024-05-13 PROCEDURE — 1157F ADVNC CARE PLAN IN RCRD: CPT | Performed by: PSYCHIATRY & NEUROLOGY

## 2024-05-13 PROCEDURE — 1159F MED LIST DOCD IN RCRD: CPT | Performed by: PSYCHIATRY & NEUROLOGY

## 2024-05-13 RX ORDER — ACETAMINOPHEN 500 MG
5000 TABLET ORAL
COMMUNITY

## 2024-05-13 RX ORDER — DOCUSATE SODIUM 100 MG/1
100 CAPSULE, LIQUID FILLED ORAL
COMMUNITY

## 2024-05-13 RX ORDER — ACETAMINOPHEN 325 MG/1
650 TABLET ORAL EVERY 6 HOURS PRN
COMMUNITY

## 2024-05-13 RX ORDER — BISACODYL 5 MG/1
1 TABLET, COATED ORAL
COMMUNITY

## 2024-05-13 RX ORDER — TRAZODONE HYDROCHLORIDE 50 MG/1
50 TABLET ORAL NIGHTLY PRN
COMMUNITY
Start: 2024-03-14

## 2024-05-13 RX ORDER — ACETAMINOPHEN, DIPHENHYDRAMINE HCL, PHENYLEPHRINE HCL 325; 25; 5 MG/1; MG/1; MG/1
10 TABLET ORAL NIGHTLY
COMMUNITY

## 2024-05-13 RX ORDER — DIMETHICONE 13 MG/ML
425 LOTION TOPICAL
COMMUNITY

## 2024-05-13 RX ORDER — ADHESIVE BANDAGE
30 BANDAGE TOPICAL DAILY PRN
COMMUNITY

## 2024-05-13 RX ORDER — AMMONIUM LACTATE 12 G/100G
1 CREAM TOPICAL 2 TIMES DAILY
COMMUNITY

## 2024-05-13 RX ORDER — BISACODYL 10 MG/1
10 SUPPOSITORY RECTAL DAILY PRN
COMMUNITY

## 2024-05-13 RX ORDER — DEXTROMETHORPHAN HYDROBROMIDE, GUAIFENESIN 5; 100 MG/5ML; MG/5ML
1300 LIQUID ORAL
COMMUNITY

## 2024-05-13 RX ORDER — FUROSEMIDE 20 MG/1
20 TABLET ORAL
COMMUNITY
Start: 2022-05-18

## 2024-05-13 RX ORDER — MEMANTINE HYDROCHLORIDE 28 MG/1
28 CAPSULE, EXTENDED RELEASE ORAL
COMMUNITY
Start: 2021-11-12

## 2024-05-13 ASSESSMENT — ENCOUNTER SYMPTOMS
COUGH: 0
LIGHT-HEADEDNESS: 0
DIZZINESS: 0
WOUND: 1
WEAKNESS: 1
SLEEP DISTURBANCE: 1
DECREASED CONCENTRATION: 0
CONFUSION: 1
SHORTNESS OF BREATH: 0
ARTHRALGIAS: 0
TREMORS: 0
DYSPHORIC MOOD: 0
WHEEZING: 0
MYALGIAS: 0
NERVOUS/ANXIOUS: 0
PALPITATIONS: 0
NUMBNESS: 0

## 2024-05-13 NOTE — PROGRESS NOTES
"Subjective   Patient ID: Stephanie Johnson is a 78 y.o. female with past medical history of who presents for Dementia (A couple of falls, weakness.).    Previous neurologist retired, here to transfer care.  Accompanied today by her daughter.     Patient has been on same dose of medication for a long time.  Previous facility where she lived was not giving medications.  Has recently transferred to a new facility.      Daughter wondering if any other medication options available.  Had bad reaction to Donepezil.  Is currently on memantine.      Had been prescribed trazodone for sleep.  Paces around at night.  Has been picking at her nose for a long period of time.      Memory Loss    Onset quality is gradual.      Review of Systems   Respiratory:  Negative for cough, shortness of breath and wheezing.    Cardiovascular:  Negative for chest pain and palpitations.   Musculoskeletal:  Negative for arthralgias and myalgias.   Skin:  Positive for wound. Negative for pallor and rash.   Neurological:  Positive for weakness. Negative for dizziness, tremors, syncope, light-headedness and numbness.   Psychiatric/Behavioral:  Positive for confusion and sleep disturbance. Negative for decreased concentration and dysphoric mood. The patient is not nervous/anxious.        Objective     Visit Vitals  /78 (BP Location: Right arm, Patient Position: Sitting, BP Cuff Size: Large adult)   Pulse 59   Ht 1.702 m (5' 7\")   Wt 78.9 kg (174 lb)   BMI 27.25 kg/m²   OB Status Postmenopausal   Smoking Status Former   BSA 1.93 m²         Physical Exam  Constitutional:       Appearance: Normal appearance.   HENT:      Head: Normocephalic and atraumatic.      Right Ear: External ear normal.      Left Ear: External ear normal.      Mouth/Throat:      Mouth: Mucous membranes are moist.      Pharynx: Oropharynx is clear.   Eyes:      Extraocular Movements: Extraocular movements intact.      Conjunctiva/sclera: Conjunctivae normal.   Pulmonary:      " Effort: Pulmonary effort is normal.   Abdominal:      Palpations: Abdomen is soft.   Musculoskeletal:      Right lower leg: Edema present.      Left lower leg: Edema present.   Skin:     General: Skin is warm and dry.   Neurological:      Mental Status: She is alert. Mental status is at baseline. She is disoriented.   Psychiatric:         Mood and Affect: Mood normal.         Speech: Speech normal.         Behavior: Behavior normal.     Neurologic Exam     Mental Status   Oriented to person.   Disoriented to place.   Disoriented to time.   Attention: normal.   Speech: speech is normal   Level of consciousness: alert    Motor Exam   Muscle bulk: normal  Overall muscle tone: normal  '      Assessment/Plan   Problem List Items Addressed This Visit    None  Visit Diagnoses       Moderate late onset Alzheimer's dementia, unspecified whether behavioral, psychotic, or mood disturbance or anxiety (Multi)    -  Primary        Will continue memantine 28 mg daily at this time.  Plan to follow up in 6 months, sooner if needed.     Patient seen and discussed with attending physician, Dr Jessa Beavers, DO PGY3  Family Medicine

## 2024-05-13 NOTE — PATIENT INSTRUCTIONS
Thank you for seeing us in neurology clinic today.      We will continue the memantine at the current dose for now; you are on the max dose already.      As we discussed, you are unfortunately not a candidate for the newer medication, Leqembi.    We will see you back in 6 months for a follow up visit, sooner if needed.

## 2024-07-10 ENCOUNTER — NURSING HOME VISIT (OUTPATIENT)
Dept: POST ACUTE CARE | Facility: EXTERNAL LOCATION | Age: 79
End: 2024-07-10
Payer: COMMERCIAL

## 2024-07-10 DIAGNOSIS — F03.A11 MILD DEMENTIA WITH AGITATION, UNSPECIFIED DEMENTIA TYPE (MULTI): ICD-10-CM

## 2024-07-10 DIAGNOSIS — F33.9 MAJOR DEPRESSION, RECURRENT, CHRONIC (CMS-HCC): Primary | ICD-10-CM

## 2024-07-10 DIAGNOSIS — M79.89 LEG SWELLING: ICD-10-CM

## 2024-07-10 NOTE — LETTER
Patient: Stephanie Johnson  : 1945    Encounter Date: 07/10/2024    [facility]: [facility]  HISTORY AND PHYSICAL NOTE      Stephanie Johnson is a 79 y.o. female that is being seen  today for admission for rehab at nursing facility  Subjective  Patient is a 78-year-old female with history of dementia and leg swelling who is being seen for admission at [facility].  Patient was recently at a memory care unit where she had a fall and was seen in the hospital.  Patient had imaging done which was negative for any fracture.  Patient was diagnosed with UTI and treated with antibiotics.  Patient is now being admitted at [facility].  Patient is not able to provide adequate review of system or history.  All history is taken from the charts.      ROS  Patient is demented and not able to provide adequate review of system    Vital signs are stable  Medication list reviewed in the facility.    Physical Exam  Constitutional: Patient does not appear to be in any acute distress  Head and Face: NCAT  Eyes: Normal external exam, EOMI  ENT: Normal external inspection of ears and nose. Oropharynx normal.  Cardiovascular: RRR, S1/S2, no murmurs, rubs, or gallops, radial pulses +2, mild swelling of the legs present  Pulmonary: CTAB, no respiratory distress.  Abdomen: +BS, soft, non-tender, nondistended, no guarding or rebound, no masses noted  MSK: Patient has difficulty in walking and is in wheelchair  Skin-few bruises present  Peripheral puslses palpable bilaterally 2+  Neuro: AAO X1, Cranial nerves 2-12 grossly intact,DTR 2+ in all 4 limbs       LABS   [unfilled]  Lab Results   Component Value Date    GLUCOSE 85 2023    CALCIUM 9.3 2023     2023    K 3.9 2023    CO2 26 2023     2023    BUN 10 2023    CREATININE 0.9 2023     Lab Results   Component Value Date    ALT 7 2023    AST 19 2023    ALKPHOS 72 2023     BILITOT 0.4 07/12/2023     Lab Results   Component Value Date    WBC 8.9 07/12/2023    HGB 13.2 07/12/2023    HCT 39.1 07/12/2023    MCV 86.5 07/12/2023     07/12/2023     Lab Results   Component Value Date    CHOL 165 07/12/2023    CHOL 170 08/15/2022     Lab Results   Component Value Date    HDL 49 (L) 07/12/2023    HDL 47.1 08/15/2022     Lab Results   Component Value Date    LDLCALC 102 07/12/2023     Lab Results   Component Value Date    TRIG 69 07/12/2023    TRIG 104 08/15/2022     Lab Results   Component Value Date    HGBA1C 5.8 07/12/2023     Other labs not included in the list above were reviewed either before or during this encounter.    History   Past Medical History:   Diagnosis Date   • Personal history of malignant neoplasm of breast     History of malignant neoplasm of breast     Past Surgical History:   Procedure Laterality Date   • OTHER SURGICAL HISTORY  09/18/2020    Breast surgery   • OTHER SURGICAL HISTORY  09/18/2020    Tubal ligation     No family history on file.  Allergies   Allergen Reactions   • Donepezil Other     tremors     Current Outpatient Medications on File Prior to Visit   Medication Sig Dispense Refill   • acetaminophen (Tylenol 8 HOUR) 650 mg ER tablet Take 2 tablets (1,300 mg) by mouth once daily.     • acetaminophen (Tylenol) 325 mg tablet Take 2 tablets (650 mg) by mouth every 6 hours if needed.     • ammonium lactate (Amlactin) 12 % cream Apply 1 Application topically twice a day.     • bisacodyl (Dulcolax) 10 mg suppository Insert 1 suppository (10 mg) into the rectum once daily as needed for constipation.     • cholecalciferol (Vitamin D-3) 5,000 Units tablet Take 1 tablet (5,000 Units) by mouth once daily.     • cranberry extract 425 mg capsule Take 425 mg by mouth once daily.     • docusate sodium (Colace) 100 mg capsule Take 1 capsule (100 mg) by mouth once daily.     • furosemide (Lasix) 20 mg tablet Take 1 tablet (20 mg) by mouth once daily.     • magnesium  hydroxide (Milk of Magnesia) 400 mg/5 mL suspension Take 30 mL by mouth once daily as needed for constipation.     • melatonin 10 mg tablet Take 1 tablet (10 mg) by mouth once daily at bedtime.     • memantine (Namenda) 28 mg capsule,sprinkle,ER 24hr Take 1 capsule (28 mg) by mouth once daily.     • multivitamin with minerals iron-free (Multivitamin 50 Plus) Take 1 tablet by mouth once daily.     • traZODone (Desyrel) 50 mg tablet Take 1 tablet (50 mg) by mouth as needed at bedtime for sleep.       No current facility-administered medications on file prior to visit.       There is no immunization history on file for this patient.  Patient's medical history was reviewed and updated either before or during this encounter.  ASSESSMENT / PLAN:  Diagnoses and all orders for this visit:  Major depression, recurrent, chronic (CMS-HCC)  Mild dementia with agitation, unspecified dementia type (Multi)  Leg swelling      Patient is being seen for admission at nursing home after hospitalization.  Patient hospital records reviewed.  Patient has been on diuretics for leg swelling.  Kidney functions were stable.  Imaging was done in the hospital reviewed and were stable.  Patient needs assistance with her activities.  Patient also  getting physical therapy.      Uriel Redmond MD       Electronically Signed By: Uriel Redmond MD   7/27/24  4:19 AM

## 2024-07-27 NOTE — PROGRESS NOTES
[facility]: [facility]  HISTORY AND PHYSICAL NOTE      Stephanie Johnson is a 79 y.o. female that is being seen  today for admission for rehab at nursing facility  Subjective   Patient is a 78-year-old female with history of dementia and leg swelling who is being seen for admission at [facility].  Patient was recently at a memory care unit where she had a fall and was seen in the hospital.  Patient had imaging done which was negative for any fracture.  Patient was diagnosed with UTI and treated with antibiotics.  Patient is now being admitted at [facility].  Patient is not able to provide adequate review of system or history.  All history is taken from the charts.      ROS  Patient is demented and not able to provide adequate review of system    Vital signs are stable  Medication list reviewed in the facility.    Physical Exam  Constitutional: Patient does not appear to be in any acute distress  Head and Face: NCAT  Eyes: Normal external exam, EOMI  ENT: Normal external inspection of ears and nose. Oropharynx normal.  Cardiovascular: RRR, S1/S2, no murmurs, rubs, or gallops, radial pulses +2, mild swelling of the legs present  Pulmonary: CTAB, no respiratory distress.  Abdomen: +BS, soft, non-tender, nondistended, no guarding or rebound, no masses noted  MSK: Patient has difficulty in walking and is in wheelchair  Skin-few bruises present  Peripheral puslses palpable bilaterally 2+  Neuro: AAO X1, Cranial nerves 2-12 grossly intact,DTR 2+ in all 4 limbs       LABS   [unfilled]  Lab Results   Component Value Date    GLUCOSE 85 07/12/2023    CALCIUM 9.3 07/12/2023     07/12/2023    K 3.9 07/12/2023    CO2 26 07/12/2023     07/12/2023    BUN 10 07/12/2023    CREATININE 0.9 07/12/2023     Lab Results   Component Value Date    ALT 7 07/12/2023    AST 19 07/12/2023    ALKPHOS 72 07/12/2023    BILITOT 0.4 07/12/2023     Lab Results   Component Value Date    WBC 8.9  07/12/2023    HGB 13.2 07/12/2023    HCT 39.1 07/12/2023    MCV 86.5 07/12/2023     07/12/2023     Lab Results   Component Value Date    CHOL 165 07/12/2023    CHOL 170 08/15/2022     Lab Results   Component Value Date    HDL 49 (L) 07/12/2023    HDL 47.1 08/15/2022     Lab Results   Component Value Date    LDLCALC 102 07/12/2023     Lab Results   Component Value Date    TRIG 69 07/12/2023    TRIG 104 08/15/2022     Lab Results   Component Value Date    HGBA1C 5.8 07/12/2023     Other labs not included in the list above were reviewed either before or during this encounter.    History    Past Medical History:   Diagnosis Date    Personal history of malignant neoplasm of breast     History of malignant neoplasm of breast     Past Surgical History:   Procedure Laterality Date    OTHER SURGICAL HISTORY  09/18/2020    Breast surgery    OTHER SURGICAL HISTORY  09/18/2020    Tubal ligation     No family history on file.  Allergies   Allergen Reactions    Donepezil Other     tremors     Current Outpatient Medications on File Prior to Visit   Medication Sig Dispense Refill    acetaminophen (Tylenol 8 HOUR) 650 mg ER tablet Take 2 tablets (1,300 mg) by mouth once daily.      acetaminophen (Tylenol) 325 mg tablet Take 2 tablets (650 mg) by mouth every 6 hours if needed.      ammonium lactate (Amlactin) 12 % cream Apply 1 Application topically twice a day.      bisacodyl (Dulcolax) 10 mg suppository Insert 1 suppository (10 mg) into the rectum once daily as needed for constipation.      cholecalciferol (Vitamin D-3) 5,000 Units tablet Take 1 tablet (5,000 Units) by mouth once daily.      cranberry extract 425 mg capsule Take 425 mg by mouth once daily.      docusate sodium (Colace) 100 mg capsule Take 1 capsule (100 mg) by mouth once daily.      furosemide (Lasix) 20 mg tablet Take 1 tablet (20 mg) by mouth once daily.      magnesium hydroxide (Milk of Magnesia) 400 mg/5 mL suspension Take 30 mL by mouth once daily as  needed for constipation.      melatonin 10 mg tablet Take 1 tablet (10 mg) by mouth once daily at bedtime.      memantine (Namenda) 28 mg capsule,sprinkle,ER 24hr Take 1 capsule (28 mg) by mouth once daily.      multivitamin with minerals iron-free (Multivitamin 50 Plus) Take 1 tablet by mouth once daily.      traZODone (Desyrel) 50 mg tablet Take 1 tablet (50 mg) by mouth as needed at bedtime for sleep.       No current facility-administered medications on file prior to visit.       There is no immunization history on file for this patient.  Patient's medical history was reviewed and updated either before or during this encounter.  ASSESSMENT / PLAN:  Diagnoses and all orders for this visit:  Major depression, recurrent, chronic (CMS-HCC)  Mild dementia with agitation, unspecified dementia type (Multi)  Leg swelling      Patient is being seen for admission at nursing home after hospitalization.  Patient hospital records reviewed.  Patient has been on diuretics for leg swelling.  Kidney functions were stable.  Imaging was done in the hospital reviewed and were stable.  Patient needs assistance with her activities.  Patient also  getting physical therapy.      Uriel Redmond MD

## 2024-08-14 ENCOUNTER — NURSING HOME VISIT (OUTPATIENT)
Dept: POST ACUTE CARE | Facility: EXTERNAL LOCATION | Age: 79
End: 2024-08-14
Payer: COMMERCIAL

## 2024-08-14 DIAGNOSIS — F03.A11 MILD DEMENTIA WITH AGITATION, UNSPECIFIED DEMENTIA TYPE (MULTI): Primary | ICD-10-CM

## 2024-08-14 DIAGNOSIS — E55.9 VITAMIN D DEFICIENCY: ICD-10-CM

## 2024-08-14 DIAGNOSIS — F33.9 MAJOR DEPRESSION, RECURRENT, CHRONIC (CMS-HCC): ICD-10-CM

## 2024-08-14 DIAGNOSIS — M79.89 LEG SWELLING: ICD-10-CM

## 2024-08-14 PROCEDURE — 99309 SBSQ NF CARE MODERATE MDM 30: CPT | Performed by: INTERNAL MEDICINE

## 2024-08-14 NOTE — LETTER
Patient: Stephanie Johnson  : 1945    Encounter Date: 2024    [facility]: [facility]  PROGRESS NOTE      Stephanie Johnson is a 79 y.o. female that is being seen  today for follow up at [facility]  facility  Subjective  Patient is a 79-year-old female with history of dementia and leg swelling who is being seen for follow up  at [facility].  Patient has been demented at baseline.  Patient follows up with the neurologist.  Lab work reviewed and has been stable.      ROS  Patient is demented and not able to provide adequate review of system  Vitals:    24 0122   BP: 124/70   Pulse: 72   Temp: 37 °C (98.6 °F)      Vital signs are stable  Medication list reviewed in the facility.    Physical Exam  Constitutional: Patient does not appear to be in any acute distress  Head and Face: NCAT  Eyes: Normal external exam, EOMI  ENT: Normal external inspection of ears and nose. Oropharynx normal.  Cardiovascular: RRR, S1/S2, no murmurs, rubs, or gallops, radial pulses +2, mild swelling of the legs present  Pulmonary: CTAB, no respiratory distress.  Abdomen: +BS, soft, non-tender, nondistended, no guarding or rebound, no masses noted  MSK: Patient has difficulty in walking and is in wheelchair  Skin-few bruises present  Peripheral puslses palpable bilaterally 2+  Neuro: AAO X1, Cranial nerves 2-12 grossly intact,DTR 2+ in all 4 limbs       LABS   [unfilled]  Lab Results   Component Value Date    GLUCOSE 85 2023    CALCIUM 9.3 2023     2023    K 3.9 2023    CO2 26 2023     2023    BUN 10 2023    CREATININE 0.9 2023     Lab Results   Component Value Date    ALT 7 2023    AST 19 2023    ALKPHOS 72 2023    BILITOT 0.4 2023     Lab Results   Component Value Date    WBC 8.9 2023    HGB 13.2 2023    HCT 39.1 2023    MCV 86.5 2023     2023     Lab Results   Component  Value Date    CHOL 165 07/12/2023    CHOL 170 08/15/2022     Lab Results   Component Value Date    HDL 49 (L) 07/12/2023    HDL 47.1 08/15/2022     Lab Results   Component Value Date    LDLCALC 102 07/12/2023     Lab Results   Component Value Date    TRIG 69 07/12/2023    TRIG 104 08/15/2022     Lab Results   Component Value Date    HGBA1C 5.8 07/12/2023     Other labs not included in the list above were reviewed either before or during this encounter.    History   Past Medical History:   Diagnosis Date   • Personal history of malignant neoplasm of breast     History of malignant neoplasm of breast     Past Surgical History:   Procedure Laterality Date   • OTHER SURGICAL HISTORY  09/18/2020    Breast surgery   • OTHER SURGICAL HISTORY  09/18/2020    Tubal ligation     No family history on file.  Allergies   Allergen Reactions   • Donepezil Other     tremors     Current Outpatient Medications on File Prior to Visit   Medication Sig Dispense Refill   • acetaminophen (Tylenol 8 HOUR) 650 mg ER tablet Take 2 tablets (1,300 mg) by mouth once daily.     • acetaminophen (Tylenol) 325 mg tablet Take 2 tablets (650 mg) by mouth every 6 hours if needed.     • ammonium lactate (Amlactin) 12 % cream Apply 1 Application topically twice a day.     • bisacodyl (Dulcolax) 10 mg suppository Insert 1 suppository (10 mg) into the rectum once daily as needed for constipation.     • cholecalciferol (Vitamin D-3) 5,000 Units tablet Take 1 tablet (5,000 Units) by mouth once daily.     • cranberry extract 425 mg capsule Take 425 mg by mouth once daily.     • furosemide (Lasix) 20 mg tablet Take 1 tablet (20 mg) by mouth once daily. Taking every Monday and Thursday     • magnesium hydroxide (Milk of Magnesia) 400 mg/5 mL suspension Take 30 mL by mouth once daily as needed for constipation.     • melatonin 10 mg tablet Take 1 tablet (10 mg) by mouth once daily at bedtime.     • multivitamin with minerals iron-free (Multivitamin 50 Plus) Take  1 tablet by mouth once daily.       No current facility-administered medications on file prior to visit.       There is no immunization history on file for this patient.  Patient's medical history was reviewed and updated either before or during this encounter.  ASSESSMENT / PLAN:  Diagnoses and all orders for this visit:  Mild dementia with agitation, unspecified dementia type (Multi)  Leg swelling  Major depression, recurrent, chronic (CMS-HCC)  Vitamin D deficiency      Patient is being seen for follow-up at [facility].  Leg swelling has been better.  Lab work reviewed kidney functions have been stable.  Patient does have dementia.  Patient follows up with neurologist.      Uriel Redmond MD       Electronically Signed By: Uriel Redmond MD   12/31/24 10:26 AM

## 2024-09-11 ENCOUNTER — NURSING HOME VISIT (OUTPATIENT)
Dept: POST ACUTE CARE | Facility: EXTERNAL LOCATION | Age: 79
End: 2024-09-11
Payer: COMMERCIAL

## 2024-09-11 DIAGNOSIS — E55.9 VITAMIN D DEFICIENCY: ICD-10-CM

## 2024-09-11 DIAGNOSIS — M79.89 LEG SWELLING: ICD-10-CM

## 2024-09-11 DIAGNOSIS — F33.9 MAJOR DEPRESSION, RECURRENT, CHRONIC (CMS-HCC): ICD-10-CM

## 2024-09-11 DIAGNOSIS — F03.A11 MILD DEMENTIA WITH AGITATION, UNSPECIFIED DEMENTIA TYPE (MULTI): Primary | ICD-10-CM

## 2024-09-11 PROCEDURE — 99309 SBSQ NF CARE MODERATE MDM 30: CPT | Performed by: INTERNAL MEDICINE

## 2024-09-11 NOTE — LETTER
Patient: Stephanie Johnson  : 1945    Encounter Date: 2024    [facility]: [facility]  PROGRESS NOTE      Stephanie Johnson is a 79 y.o. female that is being seen  today for follow up at [facility]  facility  Subjective  Patient is a 79-year-old female with history of dementia and leg swelling who is being seen for follow up  at [facility].  Patient has been demented at baseline.  Patient follows up with the neurologist.  Lab work reviewed and has been stable.      ROS  Patient is demented and not able to provide adequate review of system  Vitals:    24 0127   BP: 124/70   Pulse: 78   Temp: 36 °C (96.8 °F)      Vital signs are stable  Medication list reviewed in the facility.    Physical Exam  Constitutional: Patient does not appear to be in any acute distress  Head and Face: NCAT  Eyes: Normal external exam, EOMI  ENT: Normal external inspection of ears and nose. Oropharynx normal.  Cardiovascular: RRR, S1/S2, no murmurs, rubs, or gallops, radial pulses +2, mild swelling of the legs present  Pulmonary: CTAB, no respiratory distress.  Abdomen: +BS, soft, non-tender, nondistended, no guarding or rebound, no masses noted  MSK: Patient has difficulty in walking and is in wheelchair  Skin-few bruises present  Peripheral puslses palpable bilaterally 2+  Neuro: AAO X1, Cranial nerves 2-12 grossly intact,DTR 2+ in all 4 limbs       LABS   [unfilled]  Lab Results   Component Value Date    GLUCOSE 85 2023    CALCIUM 9.3 2023     2023    K 3.9 2023    CO2 26 2023     2023    BUN 10 2023    CREATININE 0.9 2023     Lab Results   Component Value Date    ALT 7 2023    AST 19 2023    ALKPHOS 72 2023    BILITOT 0.4 2023     Lab Results   Component Value Date    WBC 8.9 2023    HGB 13.2 2023    HCT 39.1 2023    MCV 86.5 2023     2023     Lab Results   Component  Value Date    CHOL 165 07/12/2023    CHOL 170 08/15/2022     Lab Results   Component Value Date    HDL 49 (L) 07/12/2023    HDL 47.1 08/15/2022     Lab Results   Component Value Date    LDLCALC 102 07/12/2023     Lab Results   Component Value Date    TRIG 69 07/12/2023    TRIG 104 08/15/2022     Lab Results   Component Value Date    HGBA1C 5.8 07/12/2023     Other labs not included in the list above were reviewed either before or during this encounter.    History   Past Medical History:   Diagnosis Date   • Personal history of malignant neoplasm of breast     History of malignant neoplasm of breast     Past Surgical History:   Procedure Laterality Date   • OTHER SURGICAL HISTORY  09/18/2020    Breast surgery   • OTHER SURGICAL HISTORY  09/18/2020    Tubal ligation     No family history on file.  Allergies   Allergen Reactions   • Donepezil Other     tremors     Current Outpatient Medications on File Prior to Visit   Medication Sig Dispense Refill   • acetaminophen (Tylenol 8 HOUR) 650 mg ER tablet Take 2 tablets (1,300 mg) by mouth once daily.     • acetaminophen (Tylenol) 325 mg tablet Take 2 tablets (650 mg) by mouth every 6 hours if needed.     • ammonium lactate (Amlactin) 12 % cream Apply 1 Application topically twice a day.     • bisacodyl (Dulcolax) 10 mg suppository Insert 1 suppository (10 mg) into the rectum once daily as needed for constipation.     • cholecalciferol (Vitamin D-3) 5,000 Units tablet Take 1 tablet (5,000 Units) by mouth once daily.     • cranberry extract 425 mg capsule Take 425 mg by mouth once daily.     • furosemide (Lasix) 20 mg tablet Take 1 tablet (20 mg) by mouth once daily. Taking every Monday and Thursday     • magnesium hydroxide (Milk of Magnesia) 400 mg/5 mL suspension Take 30 mL by mouth once daily as needed for constipation.     • melatonin 10 mg tablet Take 1 tablet (10 mg) by mouth once daily at bedtime.     • multivitamin with minerals iron-free (Multivitamin 50 Plus) Take  1 tablet by mouth once daily.       No current facility-administered medications on file prior to visit.       There is no immunization history on file for this patient.  Patient's medical history was reviewed and updated either before or during this encounter.  ASSESSMENT / PLAN:  Diagnoses and all orders for this visit:  Mild dementia with agitation, unspecified dementia type (Multi)  Leg swelling  Major depression, recurrent, chronic (CMS-HCC)  Vitamin D deficiency      Patient is being seen for follow-up at [facility].  Leg swelling has been better.  Lab work reviewed kidney functions have been stable.  Patient does have dementia.  Patient follows up with neurologist.      Uriel Redmond MD       Electronically Signed By: Uriel Redmond MD   12/31/24 10:28 AM

## 2024-10-09 ENCOUNTER — NURSING HOME VISIT (OUTPATIENT)
Dept: POST ACUTE CARE | Facility: EXTERNAL LOCATION | Age: 79
End: 2024-10-09
Payer: COMMERCIAL

## 2024-10-09 DIAGNOSIS — M79.89 LEG SWELLING: ICD-10-CM

## 2024-10-09 DIAGNOSIS — E55.9 VITAMIN D DEFICIENCY: ICD-10-CM

## 2024-10-09 DIAGNOSIS — F33.9 MAJOR DEPRESSION, RECURRENT, CHRONIC (CMS-HCC): ICD-10-CM

## 2024-10-09 DIAGNOSIS — F03.A11 MILD DEMENTIA WITH AGITATION, UNSPECIFIED DEMENTIA TYPE (MULTI): Primary | ICD-10-CM

## 2024-10-09 PROCEDURE — 99309 SBSQ NF CARE MODERATE MDM 30: CPT | Performed by: INTERNAL MEDICINE

## 2024-10-09 NOTE — LETTER
Patient: Stephanie Johnson  : 1945    Encounter Date: 10/09/2024    [facility]: [facility]  PROGRESS NOTE      Stephanie Johnson is a 79 y.o. female that is being seen  today for follow up at [facility]  Subjective  Patient is a 79-year-old female with history of dementia and leg swelling who is being seen for follow up  at [facility].  Patient has been demented at baseline.  Patient follows up with the neurologist.  Lab work reviewed and has been stable.  Patient also has depression and is being seen by psychiatrist at the facility.      ROS  Patient is demented and not able to provide adequate review of system  Vitals:    10/09/24 0128   BP: 134/72   Pulse: 74   Temp: 36 °C (96.8 °F)      Vital signs are stable  Medication list reviewed in the facility.    Physical Exam  Constitutional: Patient does not appear to be in any acute distress  Head and Face: NCAT  Eyes: Normal external exam, EOMI  ENT: Normal external inspection of ears and nose. Oropharynx normal.  Cardiovascular: RRR, S1/S2, no murmurs, rubs, or gallops, radial pulses +2, mild swelling of the legs present  Pulmonary: CTAB, no respiratory distress.  Abdomen: +BS, soft, non-tender, nondistended, no guarding or rebound, no masses noted  MSK: Patient has difficulty in walking and is in wheelchair  Skin-few bruises present  Peripheral puslses palpable bilaterally 2+  Neuro: AAO X1, Cranial nerves 2-12 grossly intact,DTR 2+ in all 4 limbs       LABS   [unfilled]  Lab Results   Component Value Date    GLUCOSE 85 2023    CALCIUM 9.3 2023     2023    K 3.9 2023    CO2 26 2023     2023    BUN 10 2023    CREATININE 0.9 2023     Lab Results   Component Value Date    ALT 7 2023    AST 19 2023    ALKPHOS 72 2023    BILITOT 0.4 2023     Lab Results   Component Value Date    WBC 8.9 2023    HGB 13.2 2023    HCT 39.1  07/12/2023    MCV 86.5 07/12/2023     07/12/2023     Lab Results   Component Value Date    CHOL 165 07/12/2023    CHOL 170 08/15/2022     Lab Results   Component Value Date    HDL 49 (L) 07/12/2023    HDL 47.1 08/15/2022     Lab Results   Component Value Date    LDLCALC 102 07/12/2023     Lab Results   Component Value Date    TRIG 69 07/12/2023    TRIG 104 08/15/2022     Lab Results   Component Value Date    HGBA1C 5.8 07/12/2023     Other labs not included in the list above were reviewed either before or during this encounter.    History   Past Medical History:   Diagnosis Date   • Personal history of malignant neoplasm of breast     History of malignant neoplasm of breast     Past Surgical History:   Procedure Laterality Date   • OTHER SURGICAL HISTORY  09/18/2020    Breast surgery   • OTHER SURGICAL HISTORY  09/18/2020    Tubal ligation     No family history on file.  Allergies   Allergen Reactions   • Donepezil Other     tremors     Current Outpatient Medications on File Prior to Visit   Medication Sig Dispense Refill   • acetaminophen (Tylenol 8 HOUR) 650 mg ER tablet Take 2 tablets (1,300 mg) by mouth once daily.     • acetaminophen (Tylenol) 325 mg tablet Take 2 tablets (650 mg) by mouth every 6 hours if needed.     • ammonium lactate (Amlactin) 12 % cream Apply 1 Application topically twice a day.     • bisacodyl (Dulcolax) 10 mg suppository Insert 1 suppository (10 mg) into the rectum once daily as needed for constipation.     • cholecalciferol (Vitamin D-3) 5,000 Units tablet Take 1 tablet (5,000 Units) by mouth once daily.     • cranberry extract 425 mg capsule Take 425 mg by mouth once daily.     • furosemide (Lasix) 20 mg tablet Take 1 tablet (20 mg) by mouth once daily. Taking every Monday and Thursday     • magnesium hydroxide (Milk of Magnesia) 400 mg/5 mL suspension Take 30 mL by mouth once daily as needed for constipation.     • melatonin 10 mg tablet Take 1 tablet (10 mg) by mouth once  daily at bedtime.     • multivitamin with minerals iron-free (Multivitamin 50 Plus) Take 1 tablet by mouth once daily.       No current facility-administered medications on file prior to visit.       There is no immunization history on file for this patient.  Patient's medical history was reviewed and updated either before or during this encounter.  ASSESSMENT / PLAN:  Diagnoses and all orders for this visit:  Mild dementia with agitation, unspecified dementia type (Multi)  Leg swelling  Major depression, recurrent, chronic (CMS-HCC)  Vitamin D deficiency      Patient is being seen for follow-up at [facility].  Leg swelling has been better.  Lab work reviewed kidney functions have been stable.  Patient does have dementia.  Patient follows up with neurologist.      Uriel Redmond MD       Electronically Signed By: Uriel Redmond MD   12/31/24 10:30 AM

## 2024-11-12 ENCOUNTER — APPOINTMENT (OUTPATIENT)
Dept: NEUROLOGY | Facility: CLINIC | Age: 79
End: 2024-11-12
Payer: COMMERCIAL

## 2024-11-13 ENCOUNTER — NURSING HOME VISIT (OUTPATIENT)
Dept: POST ACUTE CARE | Facility: EXTERNAL LOCATION | Age: 79
End: 2024-11-13
Payer: COMMERCIAL

## 2024-11-13 DIAGNOSIS — E55.9 VITAMIN D DEFICIENCY: ICD-10-CM

## 2024-11-13 DIAGNOSIS — M79.89 LEG SWELLING: ICD-10-CM

## 2024-11-13 DIAGNOSIS — F03.A0 MILD DEMENTIA WITHOUT BEHAVIORAL DISTURBANCE, PSYCHOTIC DISTURBANCE, MOOD DISTURBANCE, OR ANXIETY, UNSPECIFIED DEMENTIA TYPE: Primary | ICD-10-CM

## 2024-11-13 DIAGNOSIS — F33.9 MAJOR DEPRESSION, RECURRENT, CHRONIC (CMS-HCC): ICD-10-CM

## 2024-11-13 PROCEDURE — 99309 SBSQ NF CARE MODERATE MDM 30: CPT | Performed by: INTERNAL MEDICINE

## 2024-11-13 NOTE — LETTER
Patient: Stephanie Johnson  : 1945    Encounter Date: 2024    [facility]: [facility]  PROGRESS NOTE      Stephanie Johnson is a 79 y.o. female that is being seen  today for follow up at [facility]  Subjective  Patient is a 79-year-old female with history of dementia and leg swelling who is being seen for follow up  at [facility].  Patient has been demented at baseline.  Patient follows up with the neurologist.  Lab work reviewed and has been stable.  Patient also has depression and is being seen by psychiatrist at the facility.      ROS  Patient is demented and not able to provide adequate review of system  Vitals:    24 0130   BP: 134/72   Pulse: 68   Temp: 36 °C (96.8 °F)      Vital signs are stable  Medication list reviewed in the facility.    Physical Exam  Constitutional: Patient does not appear to be in any acute distress  Head and Face: NCAT  Eyes: Normal external exam, EOMI  ENT: Normal external inspection of ears and nose. Oropharynx normal.  Cardiovascular: RRR, S1/S2, no murmurs, rubs, or gallops, radial pulses +2, mild swelling of the legs present  Pulmonary: CTAB, no respiratory distress.  Abdomen: +BS, soft, non-tender, nondistended, no guarding or rebound, no masses noted  MSK: Patient has difficulty in walking and is in wheelchair  Skin-few bruises present  Peripheral puslses palpable bilaterally 2+  Neuro: AAO X1, Cranial nerves 2-12 grossly intact,DTR 2+ in all 4 limbs       LABS   [unfilled]  Lab Results   Component Value Date    GLUCOSE 85 2023    CALCIUM 9.3 2023     2023    K 3.9 2023    CO2 26 2023     2023    BUN 10 2023    CREATININE 0.9 2023     Lab Results   Component Value Date    ALT 7 2023    AST 19 2023    ALKPHOS 72 2023    BILITOT 0.4 2023     Lab Results   Component Value Date    WBC 8.9 2023    HGB 13.2 2023    HCT 39.1  07/12/2023    MCV 86.5 07/12/2023     07/12/2023     Lab Results   Component Value Date    CHOL 165 07/12/2023    CHOL 170 08/15/2022     Lab Results   Component Value Date    HDL 49 (L) 07/12/2023    HDL 47.1 08/15/2022     Lab Results   Component Value Date    LDLCALC 102 07/12/2023     Lab Results   Component Value Date    TRIG 69 07/12/2023    TRIG 104 08/15/2022     Lab Results   Component Value Date    HGBA1C 5.8 07/12/2023     Other labs not included in the list above were reviewed either before or during this encounter.    History   Past Medical History:   Diagnosis Date   • Personal history of malignant neoplasm of breast     History of malignant neoplasm of breast     Past Surgical History:   Procedure Laterality Date   • OTHER SURGICAL HISTORY  09/18/2020    Breast surgery   • OTHER SURGICAL HISTORY  09/18/2020    Tubal ligation     No family history on file.  Allergies   Allergen Reactions   • Donepezil Other     tremors     Current Outpatient Medications on File Prior to Visit   Medication Sig Dispense Refill   • acetaminophen (Tylenol 8 HOUR) 650 mg ER tablet Take 2 tablets (1,300 mg) by mouth once daily.     • acetaminophen (Tylenol) 325 mg tablet Take 2 tablets (650 mg) by mouth every 6 hours if needed.     • ammonium lactate (Amlactin) 12 % cream Apply 1 Application topically twice a day.     • bisacodyl (Dulcolax) 10 mg suppository Insert 1 suppository (10 mg) into the rectum once daily as needed for constipation.     • cholecalciferol (Vitamin D-3) 5,000 Units tablet Take 1 tablet (5,000 Units) by mouth once daily.     • cranberry extract 425 mg capsule Take 425 mg by mouth once daily.     • furosemide (Lasix) 20 mg tablet Take 1 tablet (20 mg) by mouth once daily. Taking every Monday and Thursday     • magnesium hydroxide (Milk of Magnesia) 400 mg/5 mL suspension Take 30 mL by mouth once daily as needed for constipation.     • melatonin 10 mg tablet Take 1 tablet (10 mg) by mouth once  daily at bedtime.     • multivitamin with minerals iron-free (Multivitamin 50 Plus) Take 1 tablet by mouth once daily.       No current facility-administered medications on file prior to visit.       There is no immunization history on file for this patient.  Patient's medical history was reviewed and updated either before or during this encounter.  ASSESSMENT / PLAN:  Diagnoses and all orders for this visit:  Mild dementia without behavioral disturbance, psychotic disturbance, mood disturbance, or anxiety, unspecified dementia type  Vitamin D deficiency  Major depression, recurrent, chronic (CMS-HCC)  Leg swelling      Patient is being seen for follow-up at [facility]  Leg swelling has been better.  Lab work reviewed kidney functions have been stable.  Patient does have dementia.  Patient follows up with neurologist.      Uriel Redmond MD       Electronically Signed By: Uriel Redmond MD   12/31/24 10:31 AM

## 2024-12-09 ENCOUNTER — APPOINTMENT (OUTPATIENT)
Dept: NEUROLOGY | Facility: CLINIC | Age: 79
End: 2024-12-09
Payer: COMMERCIAL

## 2024-12-09 VITALS
SYSTOLIC BLOOD PRESSURE: 122 MMHG | DIASTOLIC BLOOD PRESSURE: 76 MMHG | BODY MASS INDEX: 27.25 KG/M2 | HEIGHT: 67 IN | HEART RATE: 67 BPM

## 2024-12-09 DIAGNOSIS — F02.B0 MODERATE LATE ONSET ALZHEIMER'S DEMENTIA, UNSPECIFIED WHETHER BEHAVIORAL, PSYCHOTIC, OR MOOD DISTURBANCE OR ANXIETY: Primary | ICD-10-CM

## 2024-12-09 DIAGNOSIS — G30.1 MODERATE LATE ONSET ALZHEIMER'S DEMENTIA, UNSPECIFIED WHETHER BEHAVIORAL, PSYCHOTIC, OR MOOD DISTURBANCE OR ANXIETY: Primary | ICD-10-CM

## 2024-12-09 PROCEDURE — 1036F TOBACCO NON-USER: CPT | Performed by: PSYCHIATRY & NEUROLOGY

## 2024-12-09 PROCEDURE — 1157F ADVNC CARE PLAN IN RCRD: CPT | Performed by: PSYCHIATRY & NEUROLOGY

## 2024-12-09 PROCEDURE — 1159F MED LIST DOCD IN RCRD: CPT | Performed by: PSYCHIATRY & NEUROLOGY

## 2024-12-09 PROCEDURE — 99213 OFFICE O/P EST LOW 20 MIN: CPT | Performed by: PSYCHIATRY & NEUROLOGY

## 2024-12-09 RX ORDER — MEMANTINE HYDROCHLORIDE 10 MG/1
10 TABLET ORAL 2 TIMES DAILY
COMMUNITY
End: 2024-12-09

## 2024-12-09 RX ORDER — MINERAL OIL
180 ENEMA (ML) RECTAL
COMMUNITY

## 2024-12-09 RX ORDER — MEMANTINE HYDROCHLORIDE 28 MG/1
28 CAPSULE, EXTENDED RELEASE ORAL DAILY
Qty: 30 CAPSULE | Refills: 11 | Status: SHIPPED | OUTPATIENT
Start: 2024-12-09 | End: 2025-12-09

## 2024-12-09 RX ORDER — NYSTATIN 100000 [USP'U]/G
1 POWDER TOPICAL 2 TIMES DAILY
COMMUNITY

## 2024-12-09 NOTE — PATIENT INSTRUCTIONS
Ms. Johnson has a moderately severe dementia, and seems to respond to Namenda 28 mg sprinkles daily- must be given whole by mouth or sprinkles in food.  She also needs to use a manual wheelchair with a cushion for mobility purposes.  She should follow up in 3 months.

## 2024-12-11 ENCOUNTER — NURSING HOME VISIT (OUTPATIENT)
Dept: POST ACUTE CARE | Facility: EXTERNAL LOCATION | Age: 79
End: 2024-12-11
Payer: COMMERCIAL

## 2024-12-11 DIAGNOSIS — F33.9 MAJOR DEPRESSION, RECURRENT, CHRONIC (CMS-HCC): ICD-10-CM

## 2024-12-11 DIAGNOSIS — F03.A0 MILD DEMENTIA WITHOUT BEHAVIORAL DISTURBANCE, PSYCHOTIC DISTURBANCE, MOOD DISTURBANCE, OR ANXIETY, UNSPECIFIED DEMENTIA TYPE: Primary | ICD-10-CM

## 2024-12-11 DIAGNOSIS — M79.89 LEG SWELLING: ICD-10-CM

## 2024-12-11 DIAGNOSIS — E55.9 VITAMIN D DEFICIENCY: ICD-10-CM

## 2024-12-11 PROCEDURE — 99309 SBSQ NF CARE MODERATE MDM 30: CPT | Performed by: INTERNAL MEDICINE

## 2024-12-11 NOTE — LETTER
Patient: Stephanie Johnson  : 1945    Encounter Date: 2024    [facility]: [facility]  PROGRESS NOTE      Stephanie Johnson is a 79 y.o. female that is being seen  today for follow up at [facility]  Subjective  Patient is a 79-year-old female with history of dementia and leg swelling who is being seen for follow up  at [facility].  Patient has been demented at baseline.  Patient follows up with the neurologist.  Lab work reviewed and has been stable.  Patient also has depression and is being seen by psychiatrist at the facility.      ROS  Patient is demented and not able to provide adequate review of system  Vitals:    24 0131   BP: 138/68   Pulse: 70   Temp: 36 °C (96.8 °F)      Vital signs are stable  Medication list reviewed in the facility.    Physical Exam  Constitutional: Patient does not appear to be in any acute distress  Head and Face: NCAT  Eyes: Normal external exam, EOMI  ENT: Normal external inspection of ears and nose. Oropharynx normal.  Cardiovascular: RRR, S1/S2, no murmurs, rubs, or gallops, radial pulses +2, mild swelling of the legs present  Pulmonary: CTAB, no respiratory distress.  Abdomen: +BS, soft, non-tender, nondistended, no guarding or rebound, no masses noted  MSK: Patient has difficulty in walking and is in wheelchair  Skin-few bruises present  Peripheral puslses palpable bilaterally 2+  Neuro: AAO X1, Cranial nerves 2-12 grossly intact,DTR 2+ in all 4 limbs       LABS   [unfilled]  Lab Results   Component Value Date    GLUCOSE 85 2023    CALCIUM 9.3 2023     2023    K 3.9 2023    CO2 26 2023     2023    BUN 10 2023    CREATININE 0.9 2023     Lab Results   Component Value Date    ALT 7 2023    AST 19 2023    ALKPHOS 72 2023    BILITOT 0.4 2023     Lab Results   Component Value Date    WBC 8.9 2023    HGB 13.2 2023    HCT 39.1  07/12/2023    MCV 86.5 07/12/2023     07/12/2023     Lab Results   Component Value Date    CHOL 165 07/12/2023    CHOL 170 08/15/2022     Lab Results   Component Value Date    HDL 49 (L) 07/12/2023    HDL 47.1 08/15/2022     Lab Results   Component Value Date    LDLCALC 102 07/12/2023     Lab Results   Component Value Date    TRIG 69 07/12/2023    TRIG 104 08/15/2022     Lab Results   Component Value Date    HGBA1C 5.8 07/12/2023     Other labs not included in the list above were reviewed either before or during this encounter.    History   Past Medical History:   Diagnosis Date   • Personal history of malignant neoplasm of breast     History of malignant neoplasm of breast     Past Surgical History:   Procedure Laterality Date   • OTHER SURGICAL HISTORY  09/18/2020    Breast surgery   • OTHER SURGICAL HISTORY  09/18/2020    Tubal ligation     No family history on file.  Allergies   Allergen Reactions   • Donepezil Other     tremors     Current Outpatient Medications on File Prior to Visit   Medication Sig Dispense Refill   • acetaminophen (Tylenol 8 HOUR) 650 mg ER tablet Take 2 tablets (1,300 mg) by mouth once daily.     • acetaminophen (Tylenol) 325 mg tablet Take 2 tablets (650 mg) by mouth every 6 hours if needed.     • ammonium lactate (Amlactin) 12 % cream Apply 1 Application topically twice a day.     • bisacodyl (Dulcolax) 10 mg suppository Insert 1 suppository (10 mg) into the rectum once daily as needed for constipation.     • cholecalciferol (Vitamin D-3) 5,000 Units tablet Take 1 tablet (5,000 Units) by mouth once daily.     • cranberry extract 425 mg capsule Take 425 mg by mouth once daily.     • d-mannose 500 mg capsule Take 1 capsule (500 mg) by mouth once daily at bedtime.     • fexofenadine (Allegra) 180 mg tablet Take 1 tablet (180 mg) by mouth once daily.     • furosemide (Lasix) 20 mg tablet Take 1 tablet (20 mg) by mouth once daily. Taking every Monday and Thursday     • magnesium  hydroxide (Milk of Magnesia) 400 mg/5 mL suspension Take 30 mL by mouth once daily as needed for constipation.     • melatonin 10 mg tablet Take 1 tablet (10 mg) by mouth once daily at bedtime.     • memantine (Namenda XR) 28 mg capsule,sprinkle,ER 24hr Take 1 capsule (28 mg) by mouth once daily. 30 capsule 11   • multivitamin with minerals iron-free (Multivitamin 50 Plus) Take 1 tablet by mouth once daily.     • nystatin (Mycostatin) 100,000 unit/gram powder Apply 1 Application topically twice a day.       No current facility-administered medications on file prior to visit.       There is no immunization history on file for this patient.  Patient's medical history was reviewed and updated either before or during this encounter.  ASSESSMENT / PLAN:  Diagnoses and all orders for this visit:  Mild dementia without behavioral disturbance, psychotic disturbance, mood disturbance, or anxiety, unspecified dementia type  Vitamin D deficiency  Major depression, recurrent, chronic (CMS-HCC)  Leg swelling      Patient is being seen for follow-up at long-term [facility].  Leg swelling has been better.  Lab work reviewed kidney functions have been stable.  Patient does have dementia.  Patient follows up with neurologist.      Uriel Redmond MD       Electronically Signed By: Uriel Redmond MD   12/31/24 10:32 AM

## 2024-12-31 VITALS — SYSTOLIC BLOOD PRESSURE: 124 MMHG | TEMPERATURE: 96.8 F | HEART RATE: 78 BPM | DIASTOLIC BLOOD PRESSURE: 70 MMHG

## 2024-12-31 VITALS — HEART RATE: 74 BPM | SYSTOLIC BLOOD PRESSURE: 134 MMHG | TEMPERATURE: 96.8 F | DIASTOLIC BLOOD PRESSURE: 72 MMHG

## 2024-12-31 VITALS — DIASTOLIC BLOOD PRESSURE: 72 MMHG | SYSTOLIC BLOOD PRESSURE: 134 MMHG | HEART RATE: 68 BPM | TEMPERATURE: 96.8 F

## 2024-12-31 VITALS — DIASTOLIC BLOOD PRESSURE: 70 MMHG | SYSTOLIC BLOOD PRESSURE: 124 MMHG | TEMPERATURE: 98.6 F | HEART RATE: 72 BPM

## 2024-12-31 VITALS — HEART RATE: 70 BPM | SYSTOLIC BLOOD PRESSURE: 138 MMHG | DIASTOLIC BLOOD PRESSURE: 68 MMHG | TEMPERATURE: 96.8 F

## 2024-12-31 NOTE — PROGRESS NOTES
[facility]: [facility]  PROGRESS NOTE      Stephanie Johnson is a 79 y.o. female that is being seen  today for follow up at long [facility]  facility  Subjective   Patient is a 79-year-old female with history of dementia and leg swelling who is being seen for follow up  at [facility].  Patient has been demented at baseline.  Patient follows up with the neurologist.  Lab work reviewed and has been stable.  Patient also has depression and is being seen by psychiatrist at the facility.      ROS  Patient is demented and not able to provide adequate review of system  Vitals:    10/09/24 0128   BP: 134/72   Pulse: 74   Temp: 36 °C (96.8 °F)      Vital signs are stable  Medication list reviewed in the facility.    Physical Exam  Constitutional: Patient does not appear to be in any acute distress  Head and Face: NCAT  Eyes: Normal external exam, EOMI  ENT: Normal external inspection of ears and nose. Oropharynx normal.  Cardiovascular: RRR, S1/S2, no murmurs, rubs, or gallops, radial pulses +2, mild swelling of the legs present  Pulmonary: CTAB, no respiratory distress.  Abdomen: +BS, soft, non-tender, nondistended, no guarding or rebound, no masses noted  MSK: Patient has difficulty in walking and is in wheelchair  Skin-few bruises present  Peripheral puslses palpable bilaterally 2+  Neuro: AAO X1, Cranial nerves 2-12 grossly intact,DTR 2+ in all 4 limbs       LABS   [unfilled]  Lab Results   Component Value Date    GLUCOSE 85 07/12/2023    CALCIUM 9.3 07/12/2023     07/12/2023    K 3.9 07/12/2023    CO2 26 07/12/2023     07/12/2023    BUN 10 07/12/2023    CREATININE 0.9 07/12/2023     Lab Results   Component Value Date    ALT 7 07/12/2023    AST 19 07/12/2023    ALKPHOS 72 07/12/2023    BILITOT 0.4 07/12/2023     Lab Results   Component Value Date    WBC 8.9 07/12/2023    HGB 13.2 07/12/2023    HCT 39.1 07/12/2023    MCV 86.5 07/12/2023     07/12/2023     Lab Results    Component Value Date    CHOL 165 07/12/2023    CHOL 170 08/15/2022     Lab Results   Component Value Date    HDL 49 (L) 07/12/2023    HDL 47.1 08/15/2022     Lab Results   Component Value Date    LDLCALC 102 07/12/2023     Lab Results   Component Value Date    TRIG 69 07/12/2023    TRIG 104 08/15/2022     Lab Results   Component Value Date    HGBA1C 5.8 07/12/2023     Other labs not included in the list above were reviewed either before or during this encounter.    History    Past Medical History:   Diagnosis Date    Personal history of malignant neoplasm of breast     History of malignant neoplasm of breast     Past Surgical History:   Procedure Laterality Date    OTHER SURGICAL HISTORY  09/18/2020    Breast surgery    OTHER SURGICAL HISTORY  09/18/2020    Tubal ligation     No family history on file.  Allergies   Allergen Reactions    Donepezil Other     tremors     Current Outpatient Medications on File Prior to Visit   Medication Sig Dispense Refill    acetaminophen (Tylenol 8 HOUR) 650 mg ER tablet Take 2 tablets (1,300 mg) by mouth once daily.      acetaminophen (Tylenol) 325 mg tablet Take 2 tablets (650 mg) by mouth every 6 hours if needed.      ammonium lactate (Amlactin) 12 % cream Apply 1 Application topically twice a day.      bisacodyl (Dulcolax) 10 mg suppository Insert 1 suppository (10 mg) into the rectum once daily as needed for constipation.      cholecalciferol (Vitamin D-3) 5,000 Units tablet Take 1 tablet (5,000 Units) by mouth once daily.      cranberry extract 425 mg capsule Take 425 mg by mouth once daily.      furosemide (Lasix) 20 mg tablet Take 1 tablet (20 mg) by mouth once daily. Taking every Monday and Thursday      magnesium hydroxide (Milk of Magnesia) 400 mg/5 mL suspension Take 30 mL by mouth once daily as needed for constipation.      melatonin 10 mg tablet Take 1 tablet (10 mg) by mouth once daily at bedtime.      multivitamin with minerals iron-free (Multivitamin 50 Plus) Take  1 tablet by mouth once daily.       No current facility-administered medications on file prior to visit.       There is no immunization history on file for this patient.  Patient's medical history was reviewed and updated either before or during this encounter.  ASSESSMENT / PLAN:  Diagnoses and all orders for this visit:  Mild dementia with agitation, unspecified dementia type (Multi)  Leg swelling  Major depression, recurrent, chronic (CMS-HCC)  Vitamin D deficiency      Patient is being seen for follow-up at [facility].  Leg swelling has been better.  Lab work reviewed kidney functions have been stable.  Patient does have dementia.  Patient follows up with neurologist.      Uriel Redmond MD

## 2024-12-31 NOTE — PROGRESS NOTES
[facility]: [facility]  PROGRESS NOTE      Stephanie Johnson is a 79 y.o. female that is being seen  today for follow up at long [facility]  facility  Subjective   Patient is a 79-year-old female with history of dementia and leg swelling who is being seen for follow up  at [facility].  Patient has been demented at baseline.  Patient follows up with the neurologist.  Lab work reviewed and has been stable.  Patient also has depression and is being seen by psychiatrist at the facility.      ROS  Patient is demented and not able to provide adequate review of system  Vitals:    11/13/24 0130   BP: 134/72   Pulse: 68   Temp: 36 °C (96.8 °F)      Vital signs are stable  Medication list reviewed in the facility.    Physical Exam  Constitutional: Patient does not appear to be in any acute distress  Head and Face: NCAT  Eyes: Normal external exam, EOMI  ENT: Normal external inspection of ears and nose. Oropharynx normal.  Cardiovascular: RRR, S1/S2, no murmurs, rubs, or gallops, radial pulses +2, mild swelling of the legs present  Pulmonary: CTAB, no respiratory distress.  Abdomen: +BS, soft, non-tender, nondistended, no guarding or rebound, no masses noted  MSK: Patient has difficulty in walking and is in wheelchair  Skin-few bruises present  Peripheral puslses palpable bilaterally 2+  Neuro: AAO X1, Cranial nerves 2-12 grossly intact,DTR 2+ in all 4 limbs       LABS   [unfilled]  Lab Results   Component Value Date    GLUCOSE 85 07/12/2023    CALCIUM 9.3 07/12/2023     07/12/2023    K 3.9 07/12/2023    CO2 26 07/12/2023     07/12/2023    BUN 10 07/12/2023    CREATININE 0.9 07/12/2023     Lab Results   Component Value Date    ALT 7 07/12/2023    AST 19 07/12/2023    ALKPHOS 72 07/12/2023    BILITOT 0.4 07/12/2023     Lab Results   Component Value Date    WBC 8.9 07/12/2023    HGB 13.2 07/12/2023    HCT 39.1 07/12/2023    MCV 86.5 07/12/2023     07/12/2023     Lab Results    Component Value Date    CHOL 165 07/12/2023    CHOL 170 08/15/2022     Lab Results   Component Value Date    HDL 49 (L) 07/12/2023    HDL 47.1 08/15/2022     Lab Results   Component Value Date    LDLCALC 102 07/12/2023     Lab Results   Component Value Date    TRIG 69 07/12/2023    TRIG 104 08/15/2022     Lab Results   Component Value Date    HGBA1C 5.8 07/12/2023     Other labs not included in the list above were reviewed either before or during this encounter.    History    Past Medical History:   Diagnosis Date    Personal history of malignant neoplasm of breast     History of malignant neoplasm of breast     Past Surgical History:   Procedure Laterality Date    OTHER SURGICAL HISTORY  09/18/2020    Breast surgery    OTHER SURGICAL HISTORY  09/18/2020    Tubal ligation     No family history on file.  Allergies   Allergen Reactions    Donepezil Other     tremors     Current Outpatient Medications on File Prior to Visit   Medication Sig Dispense Refill    acetaminophen (Tylenol 8 HOUR) 650 mg ER tablet Take 2 tablets (1,300 mg) by mouth once daily.      acetaminophen (Tylenol) 325 mg tablet Take 2 tablets (650 mg) by mouth every 6 hours if needed.      ammonium lactate (Amlactin) 12 % cream Apply 1 Application topically twice a day.      bisacodyl (Dulcolax) 10 mg suppository Insert 1 suppository (10 mg) into the rectum once daily as needed for constipation.      cholecalciferol (Vitamin D-3) 5,000 Units tablet Take 1 tablet (5,000 Units) by mouth once daily.      cranberry extract 425 mg capsule Take 425 mg by mouth once daily.      furosemide (Lasix) 20 mg tablet Take 1 tablet (20 mg) by mouth once daily. Taking every Monday and Thursday      magnesium hydroxide (Milk of Magnesia) 400 mg/5 mL suspension Take 30 mL by mouth once daily as needed for constipation.      melatonin 10 mg tablet Take 1 tablet (10 mg) by mouth once daily at bedtime.      multivitamin with minerals iron-free (Multivitamin 50 Plus) Take  1 tablet by mouth once daily.       No current facility-administered medications on file prior to visit.       There is no immunization history on file for this patient.  Patient's medical history was reviewed and updated either before or during this encounter.  ASSESSMENT / PLAN:  Diagnoses and all orders for this visit:  Mild dementia without behavioral disturbance, psychotic disturbance, mood disturbance, or anxiety, unspecified dementia type  Vitamin D deficiency  Major depression, recurrent, chronic (CMS-HCC)  Leg swelling      Patient is being seen for follow-up at [facility].  Leg swelling has been better.  Lab work reviewed kidney functions have been stable.  Patient does have dementia.  Patient follows up with neurologist.      Uriel Redmond MD

## 2024-12-31 NOTE — PROGRESS NOTES
[facility]: [facility]  PROGRESS NOTE      Stephanie Johnson is a 79 y.o. female that is being seen  today for follow up at long [facility]  facility  Subjective   Patient is a 79-year-old female with history of dementia and leg swelling who is being seen for follow up  at [facility].  Patient has been demented at baseline.  Patient follows up with the neurologist.  Lab work reviewed and has been stable.  Patient also has depression and is being seen by psychiatrist at the facility.      ROS  Patient is demented and not able to provide adequate review of system  Vitals:    12/11/24 0131   BP: 138/68   Pulse: 70   Temp: 36 °C (96.8 °F)      Vital signs are stable  Medication list reviewed in the facility.    Physical Exam  Constitutional: Patient does not appear to be in any acute distress  Head and Face: NCAT  Eyes: Normal external exam, EOMI  ENT: Normal external inspection of ears and nose. Oropharynx normal.  Cardiovascular: RRR, S1/S2, no murmurs, rubs, or gallops, radial pulses +2, mild swelling of the legs present  Pulmonary: CTAB, no respiratory distress.  Abdomen: +BS, soft, non-tender, nondistended, no guarding or rebound, no masses noted  MSK: Patient has difficulty in walking and is in wheelchair  Skin-few bruises present  Peripheral puslses palpable bilaterally 2+  Neuro: AAO X1, Cranial nerves 2-12 grossly intact,DTR 2+ in all 4 limbs       LABS   [unfilled]  Lab Results   Component Value Date    GLUCOSE 85 07/12/2023    CALCIUM 9.3 07/12/2023     07/12/2023    K 3.9 07/12/2023    CO2 26 07/12/2023     07/12/2023    BUN 10 07/12/2023    CREATININE 0.9 07/12/2023     Lab Results   Component Value Date    ALT 7 07/12/2023    AST 19 07/12/2023    ALKPHOS 72 07/12/2023    BILITOT 0.4 07/12/2023     Lab Results   Component Value Date    WBC 8.9 07/12/2023    HGB 13.2 07/12/2023    HCT 39.1 07/12/2023    MCV 86.5 07/12/2023     07/12/2023     Lab Results    Component Value Date    CHOL 165 07/12/2023    CHOL 170 08/15/2022     Lab Results   Component Value Date    HDL 49 (L) 07/12/2023    HDL 47.1 08/15/2022     Lab Results   Component Value Date    LDLCALC 102 07/12/2023     Lab Results   Component Value Date    TRIG 69 07/12/2023    TRIG 104 08/15/2022     Lab Results   Component Value Date    HGBA1C 5.8 07/12/2023     Other labs not included in the list above were reviewed either before or during this encounter.    History    Past Medical History:   Diagnosis Date    Personal history of malignant neoplasm of breast     History of malignant neoplasm of breast     Past Surgical History:   Procedure Laterality Date    OTHER SURGICAL HISTORY  09/18/2020    Breast surgery    OTHER SURGICAL HISTORY  09/18/2020    Tubal ligation     No family history on file.  Allergies   Allergen Reactions    Donepezil Other     tremors     Current Outpatient Medications on File Prior to Visit   Medication Sig Dispense Refill    acetaminophen (Tylenol 8 HOUR) 650 mg ER tablet Take 2 tablets (1,300 mg) by mouth once daily.      acetaminophen (Tylenol) 325 mg tablet Take 2 tablets (650 mg) by mouth every 6 hours if needed.      ammonium lactate (Amlactin) 12 % cream Apply 1 Application topically twice a day.      bisacodyl (Dulcolax) 10 mg suppository Insert 1 suppository (10 mg) into the rectum once daily as needed for constipation.      cholecalciferol (Vitamin D-3) 5,000 Units tablet Take 1 tablet (5,000 Units) by mouth once daily.      cranberry extract 425 mg capsule Take 425 mg by mouth once daily.      d-mannose 500 mg capsule Take 1 capsule (500 mg) by mouth once daily at bedtime.      fexofenadine (Allegra) 180 mg tablet Take 1 tablet (180 mg) by mouth once daily.      furosemide (Lasix) 20 mg tablet Take 1 tablet (20 mg) by mouth once daily. Taking every Monday and Thursday      magnesium hydroxide (Milk of Magnesia) 400 mg/5 mL suspension Take 30 mL by mouth once daily as  needed for constipation.      melatonin 10 mg tablet Take 1 tablet (10 mg) by mouth once daily at bedtime.      memantine (Namenda XR) 28 mg capsule,sprinkle,ER 24hr Take 1 capsule (28 mg) by mouth once daily. 30 capsule 11    multivitamin with minerals iron-free (Multivitamin 50 Plus) Take 1 tablet by mouth once daily.      nystatin (Mycostatin) 100,000 unit/gram powder Apply 1 Application topically twice a day.       No current facility-administered medications on file prior to visit.       There is no immunization history on file for this patient.  Patient's medical history was reviewed and updated either before or during this encounter.  ASSESSMENT / PLAN:  Diagnoses and all orders for this visit:  Mild dementia without behavioral disturbance, psychotic disturbance, mood disturbance, or anxiety, unspecified dementia type  Vitamin D deficiency  Major depression, recurrent, chronic (CMS-HCC)  Leg swelling      Patient is being seen for follow-up at long-term [facility].  Leg swelling has been better.  Lab work reviewed kidney functions have been stable.  Patient does have dementia.  Patient follows up with neurologist.      Uriel Redmond MD

## 2024-12-31 NOTE — PROGRESS NOTES
[facility]: [facility]  PROGRESS NOTE      Stephanie Johnson is a 79 y.o. female that is being seen  today for follow up at long term [facility]  facility  Subjective   Patient is a 79-year-old female with history of dementia and leg swelling who is being seen for follow up  at [facility].  Patient has been demented at baseline.  Patient follows up with the neurologist.  Lab work reviewed and has been stable.      ROS  Patient is demented and not able to provide adequate review of system  Vitals:    08/14/24 0122   BP: 124/70   Pulse: 72   Temp: 37 °C (98.6 °F)      Vital signs are stable  Medication list reviewed in the facility.    Physical Exam  Constitutional: Patient does not appear to be in any acute distress  Head and Face: NCAT  Eyes: Normal external exam, EOMI  ENT: Normal external inspection of ears and nose. Oropharynx normal.  Cardiovascular: RRR, S1/S2, no murmurs, rubs, or gallops, radial pulses +2, mild swelling of the legs present  Pulmonary: CTAB, no respiratory distress.  Abdomen: +BS, soft, non-tender, nondistended, no guarding or rebound, no masses noted  MSK: Patient has difficulty in walking and is in wheelchair  Skin-few bruises present  Peripheral puslses palpable bilaterally 2+  Neuro: AAO X1, Cranial nerves 2-12 grossly intact,DTR 2+ in all 4 limbs       LABS   [unfilled]  Lab Results   Component Value Date    GLUCOSE 85 07/12/2023    CALCIUM 9.3 07/12/2023     07/12/2023    K 3.9 07/12/2023    CO2 26 07/12/2023     07/12/2023    BUN 10 07/12/2023    CREATININE 0.9 07/12/2023     Lab Results   Component Value Date    ALT 7 07/12/2023    AST 19 07/12/2023    ALKPHOS 72 07/12/2023    BILITOT 0.4 07/12/2023     Lab Results   Component Value Date    WBC 8.9 07/12/2023    HGB 13.2 07/12/2023    HCT 39.1 07/12/2023    MCV 86.5 07/12/2023     07/12/2023     Lab Results   Component Value Date    CHOL 165 07/12/2023    CHOL 170 08/15/2022     Lab Results    Component Value Date    HDL 49 (L) 07/12/2023    HDL 47.1 08/15/2022     Lab Results   Component Value Date    LDLCALC 102 07/12/2023     Lab Results   Component Value Date    TRIG 69 07/12/2023    TRIG 104 08/15/2022     Lab Results   Component Value Date    HGBA1C 5.8 07/12/2023     Other labs not included in the list above were reviewed either before or during this encounter.    History    Past Medical History:   Diagnosis Date    Personal history of malignant neoplasm of breast     History of malignant neoplasm of breast     Past Surgical History:   Procedure Laterality Date    OTHER SURGICAL HISTORY  09/18/2020    Breast surgery    OTHER SURGICAL HISTORY  09/18/2020    Tubal ligation     No family history on file.  Allergies   Allergen Reactions    Donepezil Other     tremors     Current Outpatient Medications on File Prior to Visit   Medication Sig Dispense Refill    acetaminophen (Tylenol 8 HOUR) 650 mg ER tablet Take 2 tablets (1,300 mg) by mouth once daily.      acetaminophen (Tylenol) 325 mg tablet Take 2 tablets (650 mg) by mouth every 6 hours if needed.      ammonium lactate (Amlactin) 12 % cream Apply 1 Application topically twice a day.      bisacodyl (Dulcolax) 10 mg suppository Insert 1 suppository (10 mg) into the rectum once daily as needed for constipation.      cholecalciferol (Vitamin D-3) 5,000 Units tablet Take 1 tablet (5,000 Units) by mouth once daily.      cranberry extract 425 mg capsule Take 425 mg by mouth once daily.      furosemide (Lasix) 20 mg tablet Take 1 tablet (20 mg) by mouth once daily. Taking every Monday and Thursday      magnesium hydroxide (Milk of Magnesia) 400 mg/5 mL suspension Take 30 mL by mouth once daily as needed for constipation.      melatonin 10 mg tablet Take 1 tablet (10 mg) by mouth once daily at bedtime.      multivitamin with minerals iron-free (Multivitamin 50 Plus) Take 1 tablet by mouth once daily.       No current facility-administered medications on  file prior to visit.       There is no immunization history on file for this patient.  Patient's medical history was reviewed and updated either before or during this encounter.  ASSESSMENT / PLAN:  Diagnoses and all orders for this visit:  Mild dementia with agitation, unspecified dementia type (Multi)  Leg swelling  Major depression, recurrent, chronic (CMS-HCC)  Vitamin D deficiency      Patient is being seen for follow-up at [facility].  Leg swelling has been better.  Lab work reviewed kidney functions have been stable.  Patient does have dementia.  Patient follows up with neurologist.      Uriel Redmond MD    71

## 2024-12-31 NOTE — PROGRESS NOTES
[facility]: [facility]  PROGRESS NOTE      Stephanie Johnson is a 79 y.o. female that is being seen  today for follow up at long term [facility]  facility  Subjective   Patient is a 79-year-old female with history of dementia and leg swelling who is being seen for follow up  at [facility].  Patient has been demented at baseline.  Patient follows up with the neurologist.  Lab work reviewed and has been stable.      ROS  Patient is demented and not able to provide adequate review of system  Vitals:    09/11/24 0127   BP: 124/70   Pulse: 78   Temp: 36 °C (96.8 °F)      Vital signs are stable  Medication list reviewed in the facility.    Physical Exam  Constitutional: Patient does not appear to be in any acute distress  Head and Face: NCAT  Eyes: Normal external exam, EOMI  ENT: Normal external inspection of ears and nose. Oropharynx normal.  Cardiovascular: RRR, S1/S2, no murmurs, rubs, or gallops, radial pulses +2, mild swelling of the legs present  Pulmonary: CTAB, no respiratory distress.  Abdomen: +BS, soft, non-tender, nondistended, no guarding or rebound, no masses noted  MSK: Patient has difficulty in walking and is in wheelchair  Skin-few bruises present  Peripheral puslses palpable bilaterally 2+  Neuro: AAO X1, Cranial nerves 2-12 grossly intact,DTR 2+ in all 4 limbs       LABS   [unfilled]  Lab Results   Component Value Date    GLUCOSE 85 07/12/2023    CALCIUM 9.3 07/12/2023     07/12/2023    K 3.9 07/12/2023    CO2 26 07/12/2023     07/12/2023    BUN 10 07/12/2023    CREATININE 0.9 07/12/2023     Lab Results   Component Value Date    ALT 7 07/12/2023    AST 19 07/12/2023    ALKPHOS 72 07/12/2023    BILITOT 0.4 07/12/2023     Lab Results   Component Value Date    WBC 8.9 07/12/2023    HGB 13.2 07/12/2023    HCT 39.1 07/12/2023    MCV 86.5 07/12/2023     07/12/2023     Lab Results   Component Value Date    CHOL 165 07/12/2023    CHOL 170 08/15/2022     Lab Results    Component Value Date    HDL 49 (L) 07/12/2023    HDL 47.1 08/15/2022     Lab Results   Component Value Date    LDLCALC 102 07/12/2023     Lab Results   Component Value Date    TRIG 69 07/12/2023    TRIG 104 08/15/2022     Lab Results   Component Value Date    HGBA1C 5.8 07/12/2023     Other labs not included in the list above were reviewed either before or during this encounter.    History    Past Medical History:   Diagnosis Date    Personal history of malignant neoplasm of breast     History of malignant neoplasm of breast     Past Surgical History:   Procedure Laterality Date    OTHER SURGICAL HISTORY  09/18/2020    Breast surgery    OTHER SURGICAL HISTORY  09/18/2020    Tubal ligation     No family history on file.  Allergies   Allergen Reactions    Donepezil Other     tremors     Current Outpatient Medications on File Prior to Visit   Medication Sig Dispense Refill    acetaminophen (Tylenol 8 HOUR) 650 mg ER tablet Take 2 tablets (1,300 mg) by mouth once daily.      acetaminophen (Tylenol) 325 mg tablet Take 2 tablets (650 mg) by mouth every 6 hours if needed.      ammonium lactate (Amlactin) 12 % cream Apply 1 Application topically twice a day.      bisacodyl (Dulcolax) 10 mg suppository Insert 1 suppository (10 mg) into the rectum once daily as needed for constipation.      cholecalciferol (Vitamin D-3) 5,000 Units tablet Take 1 tablet (5,000 Units) by mouth once daily.      cranberry extract 425 mg capsule Take 425 mg by mouth once daily.      furosemide (Lasix) 20 mg tablet Take 1 tablet (20 mg) by mouth once daily. Taking every Monday and Thursday      magnesium hydroxide (Milk of Magnesia) 400 mg/5 mL suspension Take 30 mL by mouth once daily as needed for constipation.      melatonin 10 mg tablet Take 1 tablet (10 mg) by mouth once daily at bedtime.      multivitamin with minerals iron-free (Multivitamin 50 Plus) Take 1 tablet by mouth once daily.       No current facility-administered medications on  file prior to visit.       There is no immunization history on file for this patient.  Patient's medical history was reviewed and updated either before or during this encounter.  ASSESSMENT / PLAN:  Diagnoses and all orders for this visit:  Mild dementia with agitation, unspecified dementia type (Multi)  Leg swelling  Major depression, recurrent, chronic (CMS-HCC)  Vitamin D deficiency      Patient is being seen for follow-up at [facility].  Leg swelling has been better.  Lab work reviewed kidney functions have been stable.  Patient does have dementia.  Patient follows up with neurologist.      Uriel Redmond MD

## 2025-01-06 DIAGNOSIS — F03.90 DEMENTIA WITHOUT BEHAVIORAL DISTURBANCE (MULTI): Primary | ICD-10-CM

## 2025-01-08 ENCOUNTER — NURSING HOME VISIT (OUTPATIENT)
Dept: POST ACUTE CARE | Facility: EXTERNAL LOCATION | Age: 80
End: 2025-01-08
Payer: COMMERCIAL

## 2025-01-08 DIAGNOSIS — E55.9 VITAMIN D DEFICIENCY: ICD-10-CM

## 2025-01-08 DIAGNOSIS — M79.89 LEG SWELLING: ICD-10-CM

## 2025-01-08 DIAGNOSIS — F33.9 MAJOR DEPRESSION, RECURRENT, CHRONIC (CMS-HCC): ICD-10-CM

## 2025-01-08 DIAGNOSIS — F03.A0 MILD DEMENTIA WITHOUT BEHAVIORAL DISTURBANCE, PSYCHOTIC DISTURBANCE, MOOD DISTURBANCE, OR ANXIETY, UNSPECIFIED DEMENTIA TYPE: Primary | ICD-10-CM

## 2025-01-08 PROCEDURE — 99309 SBSQ NF CARE MODERATE MDM 30: CPT | Performed by: INTERNAL MEDICINE

## 2025-01-08 NOTE — LETTER
Patient: Stephanie Johnson  : 1945    Encounter Date: 2025    [facility]: [facility]  PROGRESS NOTE      Stephanie Johnson is a 79 y.o. female that is being seen  today for follow up at [facility]  Subjective  Patient is a 79-year-old female with history of dementia and leg swelling who is being seen for follow up  at [facility].  Patient has been demented at baseline.  Patient follows up with the neurologist.  Lab work reviewed and has been stable.  Patient also has depression and is being seen by psychiatrist at the facility.      ROS  Patient is demented and not able to provide adequate review of system  Vitals:    25 1333   BP: 124/70   Pulse: 72   Temp: 36 °C (96.8 °F)      Vital signs are stable  Medication list reviewed in the facility.    Physical Exam  Constitutional: Patient does not appear to be in any acute distress  Head and Face: NCAT  Eyes: Normal external exam, EOMI  ENT: Normal external inspection of ears and nose. Oropharynx normal.  Cardiovascular: RRR, S1/S2, no murmurs, rubs, or gallops, radial pulses +2, mild swelling of the legs present  Pulmonary: CTAB, no respiratory distress.  Abdomen: +BS, soft, non-tender, nondistended, no guarding or rebound, no masses noted  MSK: Patient has difficulty in walking and is in wheelchair  Skin-few bruises present  Peripheral puslses palpable bilaterally 2+  Neuro: AAO X1, Cranial nerves 2-12 grossly intact,DTR 2+ in all 4 limbs       LABS   [unfilled]  Lab Results   Component Value Date    GLUCOSE 85 2023    CALCIUM 9.3 2023     2023    K 3.9 2023    CO2 26 2023     2023    BUN 10 2023    CREATININE 0.9 2023     Lab Results   Component Value Date    ALT 7 2023    AST 19 2023    ALKPHOS 72 2023    BILITOT 0.4 2023     Lab Results   Component Value Date    WBC 8.9 2023    HGB 13.2 2023    HCT 39.1  07/12/2023    MCV 86.5 07/12/2023     07/12/2023     Lab Results   Component Value Date    CHOL 165 07/12/2023    CHOL 170 08/15/2022     Lab Results   Component Value Date    HDL 49 (L) 07/12/2023    HDL 47.1 08/15/2022     Lab Results   Component Value Date    LDLCALC 102 07/12/2023     Lab Results   Component Value Date    TRIG 69 07/12/2023    TRIG 104 08/15/2022     Lab Results   Component Value Date    HGBA1C 5.8 07/12/2023     Other labs not included in the list above were reviewed either before or during this encounter.    History   Past Medical History:   Diagnosis Date   • Personal history of malignant neoplasm of breast     History of malignant neoplasm of breast     Past Surgical History:   Procedure Laterality Date   • OTHER SURGICAL HISTORY  09/18/2020    Breast surgery   • OTHER SURGICAL HISTORY  09/18/2020    Tubal ligation     No family history on file.  Allergies   Allergen Reactions   • Donepezil Other     tremors     Current Outpatient Medications on File Prior to Visit   Medication Sig Dispense Refill   • acetaminophen (Tylenol 8 HOUR) 650 mg ER tablet Take 2 tablets (1,300 mg) by mouth once daily.     • ammonium lactate (Amlactin) 12 % cream Apply 1 Application topically twice a day.     • bisacodyl (Dulcolax) 10 mg suppository Insert 1 suppository (10 mg) into the rectum once daily as needed for constipation.     • cholecalciferol (Vitamin D-3) 5,000 Units tablet Take 1 tablet (5,000 Units) by mouth once daily.     • cranberry extract 425 mg capsule Take 425 mg by mouth once daily.     • d-mannose 500 mg capsule Take 1 capsule (500 mg) by mouth once daily at bedtime.     • fexofenadine (Allegra) 180 mg tablet Take 1 tablet (180 mg) by mouth once daily.     • furosemide (Lasix) 20 mg tablet Take 1 tablet (20 mg) by mouth once daily. Taking every Monday and Thursday     • melatonin 10 mg tablet Take 1 tablet (10 mg) by mouth once daily at bedtime.     • memantine (Namenda XR) 28 mg  capsule,sprinkle,ER 24hr Take 1 capsule (28 mg) by mouth once daily. 30 capsule 11   • nystatin (Mycostatin) 100,000 unit/gram powder Apply 1 Application topically twice a day.       No current facility-administered medications on file prior to visit.       There is no immunization history on file for this patient.  Patient's medical history was reviewed and updated either before or during this encounter.  ASSESSMENT / PLAN:  Diagnoses and all orders for this visit:  Mild dementia without behavioral disturbance, psychotic disturbance, mood disturbance, or anxiety, unspecified dementia type  Major depression, recurrent, chronic (CMS-HCC)  Leg swelling  Vitamin D deficiency      Patient is being seen for follow-up at [facility].  Leg swelling has been better.  Lab work reviewed kidney functions have been stable.  Patient does have dementia.  Patient follows up with neurologist.      Uriel Redmond MD       Electronically Signed By: Uriel Redmond MD   3/23/25  8:34 PM

## 2025-02-12 ENCOUNTER — NURSING HOME VISIT (OUTPATIENT)
Dept: POST ACUTE CARE | Facility: EXTERNAL LOCATION | Age: 80
End: 2025-02-12
Payer: COMMERCIAL

## 2025-02-12 DIAGNOSIS — F03.A0 MILD DEMENTIA WITHOUT BEHAVIORAL DISTURBANCE, PSYCHOTIC DISTURBANCE, MOOD DISTURBANCE, OR ANXIETY, UNSPECIFIED DEMENTIA TYPE: Primary | ICD-10-CM

## 2025-02-12 DIAGNOSIS — M79.89 LEG SWELLING: ICD-10-CM

## 2025-02-12 DIAGNOSIS — F33.9 MAJOR DEPRESSION, RECURRENT, CHRONIC (CMS-HCC): ICD-10-CM

## 2025-02-12 DIAGNOSIS — E55.9 VITAMIN D DEFICIENCY: ICD-10-CM

## 2025-02-12 PROCEDURE — 99308 SBSQ NF CARE LOW MDM 20: CPT | Performed by: INTERNAL MEDICINE

## 2025-02-12 NOTE — LETTER
Patient: Stephanie Johnson  : 1945    Encounter Date: 2025    [facility]: [facility]  PROGRESS NOTE      Stephanie Johnson is a 79 y.o. female that is being seen  today for follow up at [facility]  Subjective  Patient is a 79-year-old female with history of dementia and leg swelling who is being seen for follow up  at [facility].  Patient has been demented at baseline.  Patient follows up with the neurologist.  Lab work reviewed and has been stable.  Patient also has depression and is being seen by psychiatrist at the facility.      ROS  Patient is demented and not able to provide adequate review of system  Vitals:    25 1335   BP: 124/72   Pulse: 70   Temp: 37 °C (98.6 °F)      Vital signs are stable  Medication list reviewed in the facility.    Physical Exam  Constitutional: Patient does not appear to be in any acute distress  Head and Face: NCAT  Eyes: Normal external exam, EOMI  ENT: Normal external inspection of ears and nose. Oropharynx normal.  Cardiovascular: RRR, S1/S2, no murmurs, rubs, or gallops, radial pulses +2, mild swelling of the legs present  Pulmonary: CTAB, no respiratory distress.  Abdomen: +BS, soft, non-tender, nondistended, no guarding or rebound, no masses noted  MSK: Patient has difficulty in walking and is in wheelchair  Skin-few bruises present  Peripheral puslses palpable bilaterally 2+  Neuro: AAO X1, Cranial nerves 2-12 grossly intact,DTR 2+ in all 4 limbs       LABS   [unfilled]  Lab Results   Component Value Date    GLUCOSE 85 2023    CALCIUM 9.3 2023     2023    K 3.9 2023    CO2 26 2023     2023    BUN 10 2023    CREATININE 0.9 2023     Lab Results   Component Value Date    ALT 7 2023    AST 19 2023    ALKPHOS 72 2023    BILITOT 0.4 2023     Lab Results   Component Value Date    WBC 8.9 2023    HGB 13.2 2023    HCT 39.1  07/12/2023    MCV 86.5 07/12/2023     07/12/2023     Lab Results   Component Value Date    CHOL 165 07/12/2023    CHOL 170 08/15/2022     Lab Results   Component Value Date    HDL 49 (L) 07/12/2023    HDL 47.1 08/15/2022     Lab Results   Component Value Date    LDLCALC 102 07/12/2023     Lab Results   Component Value Date    TRIG 69 07/12/2023    TRIG 104 08/15/2022     Lab Results   Component Value Date    HGBA1C 5.8 07/12/2023     Other labs not included in the list above were reviewed either before or during this encounter.    History   Past Medical History:   Diagnosis Date   • Personal history of malignant neoplasm of breast     History of malignant neoplasm of breast     Past Surgical History:   Procedure Laterality Date   • OTHER SURGICAL HISTORY  09/18/2020    Breast surgery   • OTHER SURGICAL HISTORY  09/18/2020    Tubal ligation     No family history on file.  Allergies   Allergen Reactions   • Donepezil Other     tremors     Current Outpatient Medications on File Prior to Visit   Medication Sig Dispense Refill   • acetaminophen (Tylenol 8 HOUR) 650 mg ER tablet Take 2 tablets (1,300 mg) by mouth once daily.     • ammonium lactate (Amlactin) 12 % cream Apply 1 Application topically twice a day.     • bisacodyl (Dulcolax) 10 mg suppository Insert 1 suppository (10 mg) into the rectum once daily as needed for constipation.     • cholecalciferol (Vitamin D-3) 5,000 Units tablet Take 1 tablet (5,000 Units) by mouth once daily.     • cranberry extract 425 mg capsule Take 425 mg by mouth once daily.     • d-mannose 500 mg capsule Take 1 capsule (500 mg) by mouth once daily at bedtime.     • fexofenadine (Allegra) 180 mg tablet Take 1 tablet (180 mg) by mouth once daily.     • furosemide (Lasix) 20 mg tablet Take 1 tablet (20 mg) by mouth once daily. Taking every Monday and Thursday     • melatonin 10 mg tablet Take 1 tablet (10 mg) by mouth once daily at bedtime.     • memantine (Namenda XR) 28 mg  capsule,sprinkle,ER 24hr Take 1 capsule (28 mg) by mouth once daily. 30 capsule 11   • nystatin (Mycostatin) 100,000 unit/gram powder Apply 1 Application topically twice a day.       No current facility-administered medications on file prior to visit.       There is no immunization history on file for this patient.  Patient's medical history was reviewed and updated either before or during this encounter.  ASSESSMENT / PLAN:  Diagnoses and all orders for this visit:  Mild dementia without behavioral disturbance, psychotic disturbance, mood disturbance, or anxiety, unspecified dementia type  Leg swelling  Vitamin D deficiency  Major depression, recurrent, chronic (CMS-HCC)      Patient is being seen for follow-up at [facility] [place].  Leg swelling has been better.  Lab work reviewed kidney functions have been stable.  Patient does have dementia.  Patient follows up with neurologist.      Uriel Redmond MD       Electronically Signed By: Uriel Redmond MD   3/23/25  8:35 PM

## 2025-03-10 ENCOUNTER — APPOINTMENT (OUTPATIENT)
Dept: NEUROLOGY | Facility: CLINIC | Age: 80
End: 2025-03-10
Payer: COMMERCIAL

## 2025-03-10 VITALS
HEART RATE: 55 BPM | SYSTOLIC BLOOD PRESSURE: 112 MMHG | BODY MASS INDEX: 27.25 KG/M2 | DIASTOLIC BLOOD PRESSURE: 72 MMHG | HEIGHT: 67 IN

## 2025-03-10 DIAGNOSIS — F02.80 ALZHEIMER'S DEMENTIA OF OTHER ONSET, UNSPECIFIED DEMENTIA SEVERITY, UNSPECIFIED WHETHER BEHAVIORAL, PSYCHOTIC, OR MOOD DISTURBANCE OR ANXIETY: Primary | ICD-10-CM

## 2025-03-10 DIAGNOSIS — G30.8 ALZHEIMER'S DEMENTIA OF OTHER ONSET, UNSPECIFIED DEMENTIA SEVERITY, UNSPECIFIED WHETHER BEHAVIORAL, PSYCHOTIC, OR MOOD DISTURBANCE OR ANXIETY: Primary | ICD-10-CM

## 2025-03-10 PROCEDURE — 1036F TOBACCO NON-USER: CPT | Performed by: PSYCHIATRY & NEUROLOGY

## 2025-03-10 PROCEDURE — 1159F MED LIST DOCD IN RCRD: CPT | Performed by: PSYCHIATRY & NEUROLOGY

## 2025-03-10 PROCEDURE — 1157F ADVNC CARE PLAN IN RCRD: CPT | Performed by: PSYCHIATRY & NEUROLOGY

## 2025-03-10 PROCEDURE — 99214 OFFICE O/P EST MOD 30 MIN: CPT | Performed by: PSYCHIATRY & NEUROLOGY

## 2025-03-10 RX ORDER — ADHESIVE BANDAGE
30 BANDAGE TOPICAL DAILY
COMMUNITY

## 2025-03-10 NOTE — PROGRESS NOTES
Subjective   Stephanie Johnson is a 79 y.o.   female.  HPI  This is a 79 year old woman with moderately severe AD, last seen 12/9/24.  Objective   Neurological Exam  Alert and pleasant.  Speech is limited: able to name her daughter Rebekah, with her today.  Not able to give me the city where she lives.  She has erythematous, scaling in the legs below the knees.  She is in a wheelchair.  Her nose is erythematous.  Physical Exam  I personally reviewed laboratory, radiographic, and medical studies which were pertinent for nothing.    Assessment/Plan

## 2025-03-10 NOTE — PATIENT INSTRUCTIONS
Your mother is stable, has moderately severe Alzheimer's dementia.  She has quite a bit of erythema on the distal nose, due to rubbing and nasal irritation.  She needs to be taking 180 mg ER Allegra daily.  For her scaling legs, due to dry skin, she needs ammonium lactate external lotion, 12% twice a day, morning and night.  She needs to continue 28 mg memantine sprinkles once a day.  Follow up in 6 months.  She needs Milk of Magnesia twice a day.

## 2025-03-12 ENCOUNTER — NURSING HOME VISIT (OUTPATIENT)
Dept: POST ACUTE CARE | Facility: EXTERNAL LOCATION | Age: 80
End: 2025-03-12
Payer: COMMERCIAL

## 2025-03-12 DIAGNOSIS — K59.00 CONSTIPATION, UNSPECIFIED CONSTIPATION TYPE: ICD-10-CM

## 2025-03-12 DIAGNOSIS — M79.89 LEG SWELLING: ICD-10-CM

## 2025-03-12 DIAGNOSIS — F03.A0 MILD DEMENTIA WITHOUT BEHAVIORAL DISTURBANCE, PSYCHOTIC DISTURBANCE, MOOD DISTURBANCE, OR ANXIETY, UNSPECIFIED DEMENTIA TYPE: Primary | ICD-10-CM

## 2025-03-12 DIAGNOSIS — E55.9 VITAMIN D DEFICIENCY: ICD-10-CM

## 2025-03-12 PROCEDURE — 99309 SBSQ NF CARE MODERATE MDM 30: CPT | Performed by: INTERNAL MEDICINE

## 2025-03-12 NOTE — LETTER
Patient: Stephanie Johnson  : 1945    Encounter Date: 2025    [facility]: [facility]  PROGRESS NOTE      Stephanie Johnson is a 79 y.o. female that is being seen  today for follow up at [facility]  Subjective  Patient is a 79-year-old female with history of dementia and leg swelling who is being seen for follow up  at [facility].  Patient has been demented at baseline.  Patient follows up with the neurologist.  Lab work reviewed and has been stable.  Patient has some dryness of the legs and is being started on ammonium lactate lotion.  Since last visit patient also has been seen by neurologist.  And no change in medications have been done.  Patient also has depression and is being seen by psychiatrist at the facility.      ROS  Patient is demented and not able to provide adequate review of system  Vitals:    25 1337   BP: 128/64   Pulse: 70   Temp: 36 °C (96.8 °F)      Vital signs are stable  Medication list reviewed in the facility.    Physical Exam  Constitutional: Patient does not appear to be in any acute distress  Head and Face: NCAT  Eyes: Normal external exam, EOMI  ENT: Normal external inspection of ears and nose. Oropharynx normal.  Cardiovascular: RRR, S1/S2, no murmurs, rubs, or gallops, radial pulses +2, mild swelling of the legs present  Pulmonary: CTAB, no respiratory distress.  Abdomen: +BS, soft, non-tender, nondistended, no guarding or rebound, no masses noted  MSK: Patient has difficulty in walking and is in wheelchair  Skin-few bruises present  Peripheral puslses palpable bilaterally 2+  Neuro: AAO X1, Cranial nerves 2-12 grossly intact,DTR 2+ in all 4 limbs       LABS   [unfilled]  Lab Results   Component Value Date    GLUCOSE 85 2023    CALCIUM 9.3 2023     2023    K 3.9 2023    CO2 26 2023     2023    BUN 10 2023    CREATININE 0.9 2023     Lab Results   Component Value Date     ALT 7 07/12/2023    AST 19 07/12/2023    ALKPHOS 72 07/12/2023    BILITOT 0.4 07/12/2023     Lab Results   Component Value Date    WBC 8.9 07/12/2023    HGB 13.2 07/12/2023    HCT 39.1 07/12/2023    MCV 86.5 07/12/2023     07/12/2023     Lab Results   Component Value Date    CHOL 165 07/12/2023    CHOL 170 08/15/2022     Lab Results   Component Value Date    HDL 49 (L) 07/12/2023    HDL 47.1 08/15/2022     Lab Results   Component Value Date    LDLCALC 102 07/12/2023     Lab Results   Component Value Date    TRIG 69 07/12/2023    TRIG 104 08/15/2022     Lab Results   Component Value Date    HGBA1C 5.8 07/12/2023     Other labs not included in the list above were reviewed either before or during this encounter.    History   Past Medical History:   Diagnosis Date   • Personal history of malignant neoplasm of breast     History of malignant neoplasm of breast     Past Surgical History:   Procedure Laterality Date   • OTHER SURGICAL HISTORY  09/18/2020    Breast surgery   • OTHER SURGICAL HISTORY  09/18/2020    Tubal ligation     No family history on file.  Allergies   Allergen Reactions   • Donepezil Other     tremors     Current Outpatient Medications on File Prior to Visit   Medication Sig Dispense Refill   • acetaminophen (Tylenol 8 HOUR) 650 mg ER tablet Take 2 tablets (1,300 mg) by mouth once daily.     • ammonium lactate (Amlactin) 12 % cream Apply 1 Application topically twice a day.     • bisacodyl (Dulcolax) 10 mg suppository Insert 1 suppository (10 mg) into the rectum once daily as needed for constipation.     • cholecalciferol (Vitamin D-3) 5,000 Units tablet Take 1 tablet (5,000 Units) by mouth once daily.     • cranberry extract 425 mg capsule Take 425 mg by mouth once daily.     • d-mannose 500 mg capsule Take 1 capsule (500 mg) by mouth once daily at bedtime.     • fexofenadine (Allegra) 180 mg tablet Take 1 tablet (180 mg) by mouth once daily.     • furosemide (Lasix) 20 mg tablet Take 1  tablet (20 mg) by mouth once daily. Taking every Monday and Thursday     • magnesium hydroxide (Milk of Magnesia) 400 mg/5 mL suspension Take 30 mL by mouth once daily.     • melatonin 10 mg tablet Take 1 tablet (10 mg) by mouth once daily at bedtime.     • memantine (Namenda XR) 28 mg capsule,sprinkle,ER 24hr Take 1 capsule (28 mg) by mouth once daily. 30 capsule 11   • nystatin (Mycostatin) 100,000 unit/gram powder Apply 1 Application topically twice a day.       No current facility-administered medications on file prior to visit.       There is no immunization history on file for this patient.  Patient's medical history was reviewed and updated either before or during this encounter.  ASSESSMENT / PLAN:  Diagnoses and all orders for this visit:  Mild dementia without behavioral disturbance, psychotic disturbance, mood disturbance, or anxiety, unspecified dementia type  Leg swelling  Vitamin D deficiency  Constipation, unspecified constipation type      Patient is being seen for follow-up at [facility].  Leg swelling has been better.  Lab work reviewed kidney functions have been stable.  Patient does have dementia.  Patient follows up with neurologist.      Uriel Redmond MD       Electronically Signed By: Uriel Redmond MD   3/23/25  8:39 PM

## 2025-03-23 VITALS — SYSTOLIC BLOOD PRESSURE: 128 MMHG | DIASTOLIC BLOOD PRESSURE: 64 MMHG | TEMPERATURE: 96.8 F | HEART RATE: 70 BPM

## 2025-03-23 VITALS — HEART RATE: 70 BPM | SYSTOLIC BLOOD PRESSURE: 124 MMHG | DIASTOLIC BLOOD PRESSURE: 72 MMHG | TEMPERATURE: 98.6 F

## 2025-03-23 VITALS — SYSTOLIC BLOOD PRESSURE: 124 MMHG | DIASTOLIC BLOOD PRESSURE: 70 MMHG | HEART RATE: 72 BPM | TEMPERATURE: 96.8 F

## 2025-03-24 NOTE — PROGRESS NOTES
[facility]: [facility]  PROGRESS NOTE      Stephanie Johnson is a 79 y.o. female that is being seen  today for follow up at [facility]  facility  Subjective   Patient is a 79-year-old female with history of dementia and leg swelling who is being seen for follow up  at [facility].  Patient has been demented at baseline.  Patient follows up with the neurologist.  Lab work reviewed and has been stable.  Patient also has depression and is being seen by psychiatrist at the facility.      ROS  Patient is demented and not able to provide adequate review of system  Vitals:    01/08/25 1333   BP: 124/70   Pulse: 72   Temp: 36 °C (96.8 °F)      Vital signs are stable  Medication list reviewed in the facility.    Physical Exam  Constitutional: Patient does not appear to be in any acute distress  Head and Face: NCAT  Eyes: Normal external exam, EOMI  ENT: Normal external inspection of ears and nose. Oropharynx normal.  Cardiovascular: RRR, S1/S2, no murmurs, rubs, or gallops, radial pulses +2, mild swelling of the legs present  Pulmonary: CTAB, no respiratory distress.  Abdomen: +BS, soft, non-tender, nondistended, no guarding or rebound, no masses noted  MSK: Patient has difficulty in walking and is in wheelchair  Skin-few bruises present  Peripheral puslses palpable bilaterally 2+  Neuro: AAO X1, Cranial nerves 2-12 grossly intact,DTR 2+ in all 4 limbs       LABS   [unfilled]  Lab Results   Component Value Date    GLUCOSE 85 07/12/2023    CALCIUM 9.3 07/12/2023     07/12/2023    K 3.9 07/12/2023    CO2 26 07/12/2023     07/12/2023    BUN 10 07/12/2023    CREATININE 0.9 07/12/2023     Lab Results   Component Value Date    ALT 7 07/12/2023    AST 19 07/12/2023    ALKPHOS 72 07/12/2023    BILITOT 0.4 07/12/2023     Lab Results   Component Value Date    WBC 8.9 07/12/2023    HGB 13.2 07/12/2023    HCT 39.1 07/12/2023    MCV 86.5 07/12/2023     07/12/2023     Lab Results    Component Value Date    CHOL 165 07/12/2023    CHOL 170 08/15/2022     Lab Results   Component Value Date    HDL 49 (L) 07/12/2023    HDL 47.1 08/15/2022     Lab Results   Component Value Date    LDLCALC 102 07/12/2023     Lab Results   Component Value Date    TRIG 69 07/12/2023    TRIG 104 08/15/2022     Lab Results   Component Value Date    HGBA1C 5.8 07/12/2023     Other labs not included in the list above were reviewed either before or during this encounter.    History    Past Medical History:   Diagnosis Date    Personal history of malignant neoplasm of breast     History of malignant neoplasm of breast     Past Surgical History:   Procedure Laterality Date    OTHER SURGICAL HISTORY  09/18/2020    Breast surgery    OTHER SURGICAL HISTORY  09/18/2020    Tubal ligation     No family history on file.  Allergies   Allergen Reactions    Donepezil Other     tremors     Current Outpatient Medications on File Prior to Visit   Medication Sig Dispense Refill    acetaminophen (Tylenol 8 HOUR) 650 mg ER tablet Take 2 tablets (1,300 mg) by mouth once daily.      ammonium lactate (Amlactin) 12 % cream Apply 1 Application topically twice a day.      bisacodyl (Dulcolax) 10 mg suppository Insert 1 suppository (10 mg) into the rectum once daily as needed for constipation.      cholecalciferol (Vitamin D-3) 5,000 Units tablet Take 1 tablet (5,000 Units) by mouth once daily.      cranberry extract 425 mg capsule Take 425 mg by mouth once daily.      d-mannose 500 mg capsule Take 1 capsule (500 mg) by mouth once daily at bedtime.      fexofenadine (Allegra) 180 mg tablet Take 1 tablet (180 mg) by mouth once daily.      furosemide (Lasix) 20 mg tablet Take 1 tablet (20 mg) by mouth once daily. Taking every Monday and Thursday      melatonin 10 mg tablet Take 1 tablet (10 mg) by mouth once daily at bedtime.      memantine (Namenda XR) 28 mg capsule,sprinkle,ER 24hr Take 1 capsule (28 mg) by mouth once daily. 30 capsule 11     nystatin (Mycostatin) 100,000 unit/gram powder Apply 1 Application topically twice a day.       No current facility-administered medications on file prior to visit.       There is no immunization history on file for this patient.  Patient's medical history was reviewed and updated either before or during this encounter.  ASSESSMENT / PLAN:  Diagnoses and all orders for this visit:  Mild dementia without behavioral disturbance, psychotic disturbance, mood disturbance, or anxiety, unspecified dementia type  Major depression, recurrent, chronic (CMS-HCC)  Leg swelling  Vitamin D deficiency      Patient is being seen for follow-up at [facility].  Leg swelling has been better.  Lab work reviewed kidney functions have been stable.  Patient does have dementia.  Patient follows up with neurologist.      Uriel Redmond MD

## 2025-03-24 NOTE — PROGRESS NOTES
[facility]: [facility]  PROGRESS NOTE      Stephanie Johnson is a 79 y.o. female that is being seen  today for follow up at long [facility]  facility  Subjective   Patient is a 79-year-old female with history of dementia and leg swelling who is being seen for follow up  at [facility].  Patient has been demented at baseline.  Patient follows up with the neurologist.  Lab work reviewed and has been stable.  Patient has some dryness of the legs and is being started on ammonium lactate lotion.  Since last visit patient also has been seen by neurologist.  And no change in medications have been done.  Patient also has depression and is being seen by psychiatrist at the facility.      ROS  Patient is demented and not able to provide adequate review of system  Vitals:    03/12/25 1337   BP: 128/64   Pulse: 70   Temp: 36 °C (96.8 °F)      Vital signs are stable  Medication list reviewed in the facility.    Physical Exam  Constitutional: Patient does not appear to be in any acute distress  Head and Face: NCAT  Eyes: Normal external exam, EOMI  ENT: Normal external inspection of ears and nose. Oropharynx normal.  Cardiovascular: RRR, S1/S2, no murmurs, rubs, or gallops, radial pulses +2, mild swelling of the legs present  Pulmonary: CTAB, no respiratory distress.  Abdomen: +BS, soft, non-tender, nondistended, no guarding or rebound, no masses noted  MSK: Patient has difficulty in walking and is in wheelchair  Skin-few bruises present  Peripheral puslses palpable bilaterally 2+  Neuro: AAO X1, Cranial nerves 2-12 grossly intact,DTR 2+ in all 4 limbs       LABS   [unfilled]  Lab Results   Component Value Date    GLUCOSE 85 07/12/2023    CALCIUM 9.3 07/12/2023     07/12/2023    K 3.9 07/12/2023    CO2 26 07/12/2023     07/12/2023    BUN 10 07/12/2023    CREATININE 0.9 07/12/2023     Lab Results   Component Value Date    ALT 7 07/12/2023    AST 19 07/12/2023    ALKPHOS 72 07/12/2023     BILITOT 0.4 07/12/2023     Lab Results   Component Value Date    WBC 8.9 07/12/2023    HGB 13.2 07/12/2023    HCT 39.1 07/12/2023    MCV 86.5 07/12/2023     07/12/2023     Lab Results   Component Value Date    CHOL 165 07/12/2023    CHOL 170 08/15/2022     Lab Results   Component Value Date    HDL 49 (L) 07/12/2023    HDL 47.1 08/15/2022     Lab Results   Component Value Date    LDLCALC 102 07/12/2023     Lab Results   Component Value Date    TRIG 69 07/12/2023    TRIG 104 08/15/2022     Lab Results   Component Value Date    HGBA1C 5.8 07/12/2023     Other labs not included in the list above were reviewed either before or during this encounter.    History    Past Medical History:   Diagnosis Date    Personal history of malignant neoplasm of breast     History of malignant neoplasm of breast     Past Surgical History:   Procedure Laterality Date    OTHER SURGICAL HISTORY  09/18/2020    Breast surgery    OTHER SURGICAL HISTORY  09/18/2020    Tubal ligation     No family history on file.  Allergies   Allergen Reactions    Donepezil Other     tremors     Current Outpatient Medications on File Prior to Visit   Medication Sig Dispense Refill    acetaminophen (Tylenol 8 HOUR) 650 mg ER tablet Take 2 tablets (1,300 mg) by mouth once daily.      ammonium lactate (Amlactin) 12 % cream Apply 1 Application topically twice a day.      bisacodyl (Dulcolax) 10 mg suppository Insert 1 suppository (10 mg) into the rectum once daily as needed for constipation.      cholecalciferol (Vitamin D-3) 5,000 Units tablet Take 1 tablet (5,000 Units) by mouth once daily.      cranberry extract 425 mg capsule Take 425 mg by mouth once daily.      d-mannose 500 mg capsule Take 1 capsule (500 mg) by mouth once daily at bedtime.      fexofenadine (Allegra) 180 mg tablet Take 1 tablet (180 mg) by mouth once daily.      furosemide (Lasix) 20 mg tablet Take 1 tablet (20 mg) by mouth once daily. Taking every Monday and Thursday      magnesium  hydroxide (Milk of Magnesia) 400 mg/5 mL suspension Take 30 mL by mouth once daily.      melatonin 10 mg tablet Take 1 tablet (10 mg) by mouth once daily at bedtime.      memantine (Namenda XR) 28 mg capsule,sprinkle,ER 24hr Take 1 capsule (28 mg) by mouth once daily. 30 capsule 11    nystatin (Mycostatin) 100,000 unit/gram powder Apply 1 Application topically twice a day.       No current facility-administered medications on file prior to visit.       There is no immunization history on file for this patient.  Patient's medical history was reviewed and updated either before or during this encounter.  ASSESSMENT / PLAN:  Diagnoses and all orders for this visit:  Mild dementia without behavioral disturbance, psychotic disturbance, mood disturbance, or anxiety, unspecified dementia type  Leg swelling  Vitamin D deficiency  Constipation, unspecified constipation type      Patient is being seen for follow-up at [facility].  Leg swelling has been better.  Lab work reviewed kidney functions have been stable.  Patient does have dementia.  Patient follows up with neurologist.      Uriel Redmond MD

## 2025-03-24 NOTE — PROGRESS NOTES
[facility]: [facility]  PROGRESS NOTE      Stephanie Johnson is a 79 y.o. female that is being seen  today for follow up at long [facility]  facility  Subjective   Patient is a 79-year-old female with history of dementia and leg swelling who is being seen for follow up  at [facility].  Patient has been demented at baseline.  Patient follows up with the neurologist.  Lab work reviewed and has been stable.  Patient also has depression and is being seen by psychiatrist at the facility.      ROS  Patient is demented and not able to provide adequate review of system  Vitals:    02/12/25 1335   BP: 124/72   Pulse: 70   Temp: 37 °C (98.6 °F)      Vital signs are stable  Medication list reviewed in the facility.    Physical Exam  Constitutional: Patient does not appear to be in any acute distress  Head and Face: NCAT  Eyes: Normal external exam, EOMI  ENT: Normal external inspection of ears and nose. Oropharynx normal.  Cardiovascular: RRR, S1/S2, no murmurs, rubs, or gallops, radial pulses +2, mild swelling of the legs present  Pulmonary: CTAB, no respiratory distress.  Abdomen: +BS, soft, non-tender, nondistended, no guarding or rebound, no masses noted  MSK: Patient has difficulty in walking and is in wheelchair  Skin-few bruises present  Peripheral puslses palpable bilaterally 2+  Neuro: AAO X1, Cranial nerves 2-12 grossly intact,DTR 2+ in all 4 limbs       LABS   [unfilled]  Lab Results   Component Value Date    GLUCOSE 85 07/12/2023    CALCIUM 9.3 07/12/2023     07/12/2023    K 3.9 07/12/2023    CO2 26 07/12/2023     07/12/2023    BUN 10 07/12/2023    CREATININE 0.9 07/12/2023     Lab Results   Component Value Date    ALT 7 07/12/2023    AST 19 07/12/2023    ALKPHOS 72 07/12/2023    BILITOT 0.4 07/12/2023     Lab Results   Component Value Date    WBC 8.9 07/12/2023    HGB 13.2 07/12/2023    HCT 39.1 07/12/2023    MCV 86.5 07/12/2023     07/12/2023     Lab Results    Component Value Date    CHOL 165 07/12/2023    CHOL 170 08/15/2022     Lab Results   Component Value Date    HDL 49 (L) 07/12/2023    HDL 47.1 08/15/2022     Lab Results   Component Value Date    LDLCALC 102 07/12/2023     Lab Results   Component Value Date    TRIG 69 07/12/2023    TRIG 104 08/15/2022     Lab Results   Component Value Date    HGBA1C 5.8 07/12/2023     Other labs not included in the list above were reviewed either before or during this encounter.    History    Past Medical History:   Diagnosis Date    Personal history of malignant neoplasm of breast     History of malignant neoplasm of breast     Past Surgical History:   Procedure Laterality Date    OTHER SURGICAL HISTORY  09/18/2020    Breast surgery    OTHER SURGICAL HISTORY  09/18/2020    Tubal ligation     No family history on file.  Allergies   Allergen Reactions    Donepezil Other     tremors     Current Outpatient Medications on File Prior to Visit   Medication Sig Dispense Refill    acetaminophen (Tylenol 8 HOUR) 650 mg ER tablet Take 2 tablets (1,300 mg) by mouth once daily.      ammonium lactate (Amlactin) 12 % cream Apply 1 Application topically twice a day.      bisacodyl (Dulcolax) 10 mg suppository Insert 1 suppository (10 mg) into the rectum once daily as needed for constipation.      cholecalciferol (Vitamin D-3) 5,000 Units tablet Take 1 tablet (5,000 Units) by mouth once daily.      cranberry extract 425 mg capsule Take 425 mg by mouth once daily.      d-mannose 500 mg capsule Take 1 capsule (500 mg) by mouth once daily at bedtime.      fexofenadine (Allegra) 180 mg tablet Take 1 tablet (180 mg) by mouth once daily.      furosemide (Lasix) 20 mg tablet Take 1 tablet (20 mg) by mouth once daily. Taking every Monday and Thursday      melatonin 10 mg tablet Take 1 tablet (10 mg) by mouth once daily at bedtime.      memantine (Namenda XR) 28 mg capsule,sprinkle,ER 24hr Take 1 capsule (28 mg) by mouth once daily. 30 capsule 11     nystatin (Mycostatin) 100,000 unit/gram powder Apply 1 Application topically twice a day.       No current facility-administered medications on file prior to visit.       There is no immunization history on file for this patient.  Patient's medical history was reviewed and updated either before or during this encounter.  ASSESSMENT / PLAN:  Diagnoses and all orders for this visit:  Mild dementia without behavioral disturbance, psychotic disturbance, mood disturbance, or anxiety, unspecified dementia type  Leg swelling  Vitamin D deficiency  Major depression, recurrent, chronic (CMS-HCC)      Patient is being seen for follow-up at [facility].  Leg swelling has been better.  Lab work reviewed kidney functions have been stable.  Patient does have dementia.  Patient follows up with neurologist.      Uriel Redmond MD

## 2025-09-11 ENCOUNTER — APPOINTMENT (OUTPATIENT)
Dept: NEUROLOGY | Facility: CLINIC | Age: 80
End: 2025-09-11
Payer: COMMERCIAL